# Patient Record
Sex: FEMALE | Race: WHITE | NOT HISPANIC OR LATINO | Employment: FULL TIME | URBAN - METROPOLITAN AREA
[De-identification: names, ages, dates, MRNs, and addresses within clinical notes are randomized per-mention and may not be internally consistent; named-entity substitution may affect disease eponyms.]

---

## 2021-04-12 ENCOUNTER — OFFICE VISIT (OUTPATIENT)
Dept: FAMILY MEDICINE | Facility: CLINIC | Age: 36
End: 2021-04-12
Payer: COMMERCIAL

## 2021-04-12 VITALS
OXYGEN SATURATION: 98 % | WEIGHT: 231.25 LBS | HEART RATE: 100 BPM | SYSTOLIC BLOOD PRESSURE: 120 MMHG | HEIGHT: 67 IN | BODY MASS INDEX: 36.29 KG/M2 | TEMPERATURE: 99 F | DIASTOLIC BLOOD PRESSURE: 80 MMHG

## 2021-04-12 DIAGNOSIS — R59.9 REACTIVE LYMPHADENOPATHY: ICD-10-CM

## 2021-04-12 DIAGNOSIS — E66.9 OBESITY (BMI 30-39.9): ICD-10-CM

## 2021-04-12 DIAGNOSIS — R21 RASH: ICD-10-CM

## 2021-04-12 DIAGNOSIS — Z00.00 PREVENTATIVE HEALTH CARE: Primary | ICD-10-CM

## 2021-04-12 PROCEDURE — 99999 PR PBB SHADOW E&M-NEW PATIENT-LVL III: CPT | Mod: PBBFAC,,, | Performed by: FAMILY MEDICINE

## 2021-04-12 PROCEDURE — 99385 PR PREVENTIVE VISIT,NEW,18-39: ICD-10-PCS | Mod: S$GLB,,, | Performed by: FAMILY MEDICINE

## 2021-04-12 PROCEDURE — 99999 PR PBB SHADOW E&M-NEW PATIENT-LVL III: ICD-10-PCS | Mod: PBBFAC,,, | Performed by: FAMILY MEDICINE

## 2021-04-12 PROCEDURE — 99385 PREV VISIT NEW AGE 18-39: CPT | Mod: S$GLB,,, | Performed by: FAMILY MEDICINE

## 2021-04-12 PROCEDURE — 3008F BODY MASS INDEX DOCD: CPT | Mod: CPTII,S$GLB,, | Performed by: FAMILY MEDICINE

## 2021-04-12 PROCEDURE — 3008F PR BODY MASS INDEX (BMI) DOCUMENTED: ICD-10-PCS | Mod: CPTII,S$GLB,, | Performed by: FAMILY MEDICINE

## 2021-04-12 RX ORDER — METHYLPREDNISOLONE 4 MG/1
TABLET ORAL
Qty: 1 PACKAGE | Refills: 0 | Status: SHIPPED | OUTPATIENT
Start: 2021-04-12 | End: 2021-11-10

## 2021-04-12 RX ORDER — DOXYCYCLINE 100 MG/1
100 CAPSULE ORAL 2 TIMES DAILY
Qty: 20 CAPSULE | Refills: 0 | Status: SHIPPED | OUTPATIENT
Start: 2021-04-12 | End: 2021-11-10

## 2021-04-13 ENCOUNTER — LAB VISIT (OUTPATIENT)
Dept: LAB | Facility: HOSPITAL | Age: 36
End: 2021-04-13
Attending: FAMILY MEDICINE
Payer: COMMERCIAL

## 2021-04-13 DIAGNOSIS — Z00.00 PREVENTATIVE HEALTH CARE: ICD-10-CM

## 2021-04-13 LAB
ALBUMIN SERPL BCP-MCNC: 3.5 G/DL (ref 3.5–5.2)
ALP SERPL-CCNC: 96 U/L (ref 55–135)
ALT SERPL W/O P-5'-P-CCNC: 49 U/L (ref 10–44)
ANION GAP SERPL CALC-SCNC: 8 MMOL/L (ref 8–16)
AST SERPL-CCNC: 27 U/L (ref 10–40)
BASOPHILS # BLD AUTO: 0.03 K/UL (ref 0–0.2)
BASOPHILS NFR BLD: 0.6 % (ref 0–1.9)
BILIRUB SERPL-MCNC: 0.4 MG/DL (ref 0.1–1)
BUN SERPL-MCNC: 13 MG/DL (ref 6–20)
CALCIUM SERPL-MCNC: 8.5 MG/DL (ref 8.7–10.5)
CHLORIDE SERPL-SCNC: 106 MMOL/L (ref 95–110)
CHOLEST SERPL-MCNC: 226 MG/DL (ref 120–199)
CHOLEST/HDLC SERPL: 4.9 {RATIO} (ref 2–5)
CO2 SERPL-SCNC: 24 MMOL/L (ref 23–29)
CREAT SERPL-MCNC: 0.9 MG/DL (ref 0.5–1.4)
DIFFERENTIAL METHOD: ABNORMAL
EOSINOPHIL # BLD AUTO: 0.1 K/UL (ref 0–0.5)
EOSINOPHIL NFR BLD: 2.1 % (ref 0–8)
ERYTHROCYTE [DISTWIDTH] IN BLOOD BY AUTOMATED COUNT: 14.1 % (ref 11.5–14.5)
EST. GFR  (AFRICAN AMERICAN): >60 ML/MIN/1.73 M^2
EST. GFR  (NON AFRICAN AMERICAN): >60 ML/MIN/1.73 M^2
GLUCOSE SERPL-MCNC: 92 MG/DL (ref 70–110)
HCT VFR BLD AUTO: 39.3 % (ref 37–48.5)
HDLC SERPL-MCNC: 46 MG/DL (ref 40–75)
HDLC SERPL: 20.4 % (ref 20–50)
HGB BLD-MCNC: 12.3 G/DL (ref 12–16)
IMM GRANULOCYTES # BLD AUTO: 0.02 K/UL (ref 0–0.04)
IMM GRANULOCYTES NFR BLD AUTO: 0.4 % (ref 0–0.5)
LDLC SERPL CALC-MCNC: 153.2 MG/DL (ref 63–159)
LYMPHOCYTES # BLD AUTO: 1.7 K/UL (ref 1–4.8)
LYMPHOCYTES NFR BLD: 34.2 % (ref 18–48)
MCH RBC QN AUTO: 27 PG (ref 27–31)
MCHC RBC AUTO-ENTMCNC: 31.3 G/DL (ref 32–36)
MCV RBC AUTO: 86 FL (ref 82–98)
MONOCYTES # BLD AUTO: 0.4 K/UL (ref 0.3–1)
MONOCYTES NFR BLD: 8.7 % (ref 4–15)
NEUTROPHILS # BLD AUTO: 2.6 K/UL (ref 1.8–7.7)
NEUTROPHILS NFR BLD: 54 % (ref 38–73)
NONHDLC SERPL-MCNC: 180 MG/DL
NRBC BLD-RTO: 0 /100 WBC
PLATELET # BLD AUTO: 285 K/UL (ref 150–450)
PMV BLD AUTO: 12.2 FL (ref 9.2–12.9)
POTASSIUM SERPL-SCNC: 4.1 MMOL/L (ref 3.5–5.1)
PROT SERPL-MCNC: 7.2 G/DL (ref 6–8.4)
RBC # BLD AUTO: 4.55 M/UL (ref 4–5.4)
SODIUM SERPL-SCNC: 138 MMOL/L (ref 136–145)
T4 FREE SERPL-MCNC: 0.92 NG/DL (ref 0.71–1.51)
TRIGL SERPL-MCNC: 134 MG/DL (ref 30–150)
TSH SERPL DL<=0.005 MIU/L-ACNC: 4.49 UIU/ML (ref 0.4–4)
WBC # BLD AUTO: 4.85 K/UL (ref 3.9–12.7)

## 2021-04-13 PROCEDURE — 36415 COLL VENOUS BLD VENIPUNCTURE: CPT | Mod: PO | Performed by: FAMILY MEDICINE

## 2021-04-13 PROCEDURE — 84439 ASSAY OF FREE THYROXINE: CPT | Performed by: FAMILY MEDICINE

## 2021-04-13 PROCEDURE — 85025 COMPLETE CBC W/AUTO DIFF WBC: CPT | Performed by: FAMILY MEDICINE

## 2021-04-13 PROCEDURE — 84443 ASSAY THYROID STIM HORMONE: CPT | Performed by: FAMILY MEDICINE

## 2021-04-13 PROCEDURE — 86803 HEPATITIS C AB TEST: CPT | Performed by: FAMILY MEDICINE

## 2021-04-13 PROCEDURE — 80061 LIPID PANEL: CPT | Performed by: FAMILY MEDICINE

## 2021-04-13 PROCEDURE — 80053 COMPREHEN METABOLIC PANEL: CPT | Performed by: FAMILY MEDICINE

## 2021-04-13 PROCEDURE — 86703 HIV-1/HIV-2 1 RESULT ANTBDY: CPT | Performed by: FAMILY MEDICINE

## 2021-04-14 DIAGNOSIS — R79.89 ELEVATED TSH: Primary | ICD-10-CM

## 2021-04-14 LAB — HCV AB SERPL QL IA: NEGATIVE

## 2021-04-15 LAB — HIV 1+2 AB+HIV1 P24 AG SERPL QL IA: NEGATIVE

## 2021-10-20 ENCOUNTER — OFFICE VISIT (OUTPATIENT)
Dept: DERMATOLOGY | Facility: CLINIC | Age: 36
End: 2021-10-20
Payer: COMMERCIAL

## 2021-10-20 DIAGNOSIS — D48.5 NEOPLASM OF UNCERTAIN BEHAVIOR OF SKIN: Primary | ICD-10-CM

## 2021-10-20 DIAGNOSIS — D18.01 CHERRY ANGIOMA: ICD-10-CM

## 2021-10-20 DIAGNOSIS — D22.9 MULTIPLE BENIGN NEVI: ICD-10-CM

## 2021-10-20 PROCEDURE — 99999 PR PBB SHADOW E&M-EST. PATIENT-LVL III: ICD-10-PCS | Mod: PBBFAC,,, | Performed by: STUDENT IN AN ORGANIZED HEALTH CARE EDUCATION/TRAINING PROGRAM

## 2021-10-20 PROCEDURE — 99999 PR PBB SHADOW E&M-EST. PATIENT-LVL III: CPT | Mod: PBBFAC,,, | Performed by: STUDENT IN AN ORGANIZED HEALTH CARE EDUCATION/TRAINING PROGRAM

## 2021-10-20 PROCEDURE — 11102 TANGNTL BX SKIN SINGLE LES: CPT | Mod: S$GLB,,, | Performed by: STUDENT IN AN ORGANIZED HEALTH CARE EDUCATION/TRAINING PROGRAM

## 2021-10-20 PROCEDURE — 88305 TISSUE EXAM BY PATHOLOGIST: CPT | Mod: 26,,, | Performed by: PATHOLOGY

## 2021-10-20 PROCEDURE — 88305 TISSUE EXAM BY PATHOLOGIST: ICD-10-PCS | Mod: 26,,, | Performed by: PATHOLOGY

## 2021-10-20 PROCEDURE — 11102 PR TANGENTIAL BIOPSY, SKIN, SINGLE LESION: ICD-10-PCS | Mod: S$GLB,,, | Performed by: STUDENT IN AN ORGANIZED HEALTH CARE EDUCATION/TRAINING PROGRAM

## 2021-10-20 PROCEDURE — 88304 TISSUE EXAM BY PATHOLOGIST: CPT | Performed by: PATHOLOGY

## 2021-10-20 PROCEDURE — 1160F PR REVIEW ALL MEDS BY PRESCRIBER/CLIN PHARMACIST DOCUMENTED: ICD-10-PCS | Mod: CPTII,S$GLB,, | Performed by: STUDENT IN AN ORGANIZED HEALTH CARE EDUCATION/TRAINING PROGRAM

## 2021-10-20 PROCEDURE — 1160F RVW MEDS BY RX/DR IN RCRD: CPT | Mod: CPTII,S$GLB,, | Performed by: STUDENT IN AN ORGANIZED HEALTH CARE EDUCATION/TRAINING PROGRAM

## 2021-10-20 PROCEDURE — 99203 OFFICE O/P NEW LOW 30 MIN: CPT | Mod: 25,S$GLB,, | Performed by: STUDENT IN AN ORGANIZED HEALTH CARE EDUCATION/TRAINING PROGRAM

## 2021-10-20 PROCEDURE — 1159F PR MEDICATION LIST DOCUMENTED IN MEDICAL RECORD: ICD-10-PCS | Mod: CPTII,S$GLB,, | Performed by: STUDENT IN AN ORGANIZED HEALTH CARE EDUCATION/TRAINING PROGRAM

## 2021-10-20 PROCEDURE — 1159F MED LIST DOCD IN RCRD: CPT | Mod: CPTII,S$GLB,, | Performed by: STUDENT IN AN ORGANIZED HEALTH CARE EDUCATION/TRAINING PROGRAM

## 2021-10-20 PROCEDURE — 99203 PR OFFICE/OUTPT VISIT, NEW, LEVL III, 30-44 MIN: ICD-10-PCS | Mod: 25,S$GLB,, | Performed by: STUDENT IN AN ORGANIZED HEALTH CARE EDUCATION/TRAINING PROGRAM

## 2021-10-25 LAB
FINAL PATHOLOGIC DIAGNOSIS: NORMAL
GROSS: NORMAL
Lab: NORMAL
MICROSCOPIC EXAM: NORMAL

## 2022-03-02 ENCOUNTER — OFFICE VISIT (OUTPATIENT)
Dept: FAMILY MEDICINE | Facility: CLINIC | Age: 37
End: 2022-03-02
Payer: COMMERCIAL

## 2022-03-02 VITALS
SYSTOLIC BLOOD PRESSURE: 126 MMHG | WEIGHT: 241.63 LBS | TEMPERATURE: 98 F | HEART RATE: 99 BPM | HEIGHT: 66 IN | DIASTOLIC BLOOD PRESSURE: 86 MMHG | BODY MASS INDEX: 38.83 KG/M2 | OXYGEN SATURATION: 99 %

## 2022-03-02 DIAGNOSIS — J01.00 ACUTE NON-RECURRENT MAXILLARY SINUSITIS: Primary | ICD-10-CM

## 2022-03-02 PROCEDURE — 3074F PR MOST RECENT SYSTOLIC BLOOD PRESSURE < 130 MM HG: ICD-10-PCS | Mod: CPTII,S$GLB,, | Performed by: INTERNAL MEDICINE

## 2022-03-02 PROCEDURE — 99999 PR PBB SHADOW E&M-EST. PATIENT-LVL III: ICD-10-PCS | Mod: PBBFAC,,, | Performed by: INTERNAL MEDICINE

## 2022-03-02 PROCEDURE — 3079F PR MOST RECENT DIASTOLIC BLOOD PRESSURE 80-89 MM HG: ICD-10-PCS | Mod: CPTII,S$GLB,, | Performed by: INTERNAL MEDICINE

## 2022-03-02 PROCEDURE — 3008F BODY MASS INDEX DOCD: CPT | Mod: CPTII,S$GLB,, | Performed by: INTERNAL MEDICINE

## 2022-03-02 PROCEDURE — 3079F DIAST BP 80-89 MM HG: CPT | Mod: CPTII,S$GLB,, | Performed by: INTERNAL MEDICINE

## 2022-03-02 PROCEDURE — 1159F PR MEDICATION LIST DOCUMENTED IN MEDICAL RECORD: ICD-10-PCS | Mod: CPTII,S$GLB,, | Performed by: INTERNAL MEDICINE

## 2022-03-02 PROCEDURE — 99213 PR OFFICE/OUTPT VISIT, EST, LEVL III, 20-29 MIN: ICD-10-PCS | Mod: S$GLB,,, | Performed by: INTERNAL MEDICINE

## 2022-03-02 PROCEDURE — 1159F MED LIST DOCD IN RCRD: CPT | Mod: CPTII,S$GLB,, | Performed by: INTERNAL MEDICINE

## 2022-03-02 PROCEDURE — 99213 OFFICE O/P EST LOW 20 MIN: CPT | Mod: S$GLB,,, | Performed by: INTERNAL MEDICINE

## 2022-03-02 PROCEDURE — 3008F PR BODY MASS INDEX (BMI) DOCUMENTED: ICD-10-PCS | Mod: CPTII,S$GLB,, | Performed by: INTERNAL MEDICINE

## 2022-03-02 PROCEDURE — 3074F SYST BP LT 130 MM HG: CPT | Mod: CPTII,S$GLB,, | Performed by: INTERNAL MEDICINE

## 2022-03-02 PROCEDURE — 99999 PR PBB SHADOW E&M-EST. PATIENT-LVL III: CPT | Mod: PBBFAC,,, | Performed by: INTERNAL MEDICINE

## 2022-03-02 RX ORDER — PREDNISONE 20 MG/1
TABLET ORAL
Qty: 6 TABLET | Refills: 0 | Status: SHIPPED | OUTPATIENT
Start: 2022-03-02

## 2022-03-02 RX ORDER — AZITHROMYCIN 500 MG/1
500 TABLET, FILM COATED ORAL DAILY
Qty: 5 TABLET | Refills: 0 | Status: SHIPPED | OUTPATIENT
Start: 2022-03-02 | End: 2022-03-07

## 2022-03-02 NOTE — PROGRESS NOTES
"Subjective:      Patient ID: Dipika Harris is a 36 y.o. female.    Chief Complaint: Cough      HPI  Pt of Dr. Russ, here for over 7 days head and chest congestion, not better with dayquil.  No f/c.  Coughing up thick mucus.  No n/v/d.      Review of Systems   Constitutional: Negative for chills, diaphoresis and fever.   Respiratory: Negative for cough and shortness of breath.    Cardiovascular: Negative for chest pain, palpitations and leg swelling.   Gastrointestinal: Negative for blood in stool, constipation, diarrhea, nausea and vomiting.   Genitourinary: Negative for dysuria, frequency and hematuria.   Psychiatric/Behavioral: The patient is not nervous/anxious.          Objective:   /86   Pulse 99   Temp 98 °F (36.7 °C) (Temporal)   Ht 5' 6" (1.676 m)   Wt 109.6 kg (241 lb 10 oz)   LMP 02/10/2022   SpO2 99%   BMI 39.00 kg/m²     Physical Exam  Constitutional:       Appearance: She is well-developed.   HENT:      Right Ear: External ear normal. Tympanic membrane is not injected.      Left Ear: External ear normal. Tympanic membrane is not injected.   Eyes:      Conjunctiva/sclera: Conjunctivae normal.   Neck:      Thyroid: No thyroid mass or thyromegaly.      Vascular: No carotid bruit.   Cardiovascular:      Rate and Rhythm: Normal rate and regular rhythm.      Heart sounds: No murmur heard.    No friction rub. No gallop.   Pulmonary:      Effort: Pulmonary effort is normal.      Breath sounds: Normal breath sounds. No wheezing or rales.   Abdominal:      General: Bowel sounds are normal.      Palpations: Abdomen is soft. There is no mass.      Tenderness: There is no abdominal tenderness.   Musculoskeletal:      Cervical back: Neck supple.   Lymphadenopathy:      Cervical: No cervical adenopathy.   Neurological:      Mental Status: She is alert and oriented to person, place, and time.             Assessment:       1. Acute non-recurrent maxillary sinusitis          Plan:     Acute " non-recurrent maxillary sinusitis  -     azithromycin (ZITHROMAX) 500 MG tablet; Take 1 tablet (500 mg total) by mouth once daily. for 5 days  Dispense: 5 tablet; Refill: 0  -     predniSONE (DELTASONE) 20 MG tablet; 1 po daily x 4d, then half daily x 4d.  Dispense: 6 tablet; Refill: 0    Call if not better.

## 2024-02-05 ENCOUNTER — PATIENT OUTREACH (OUTPATIENT)
Dept: ADMINISTRATIVE | Facility: HOSPITAL | Age: 39
End: 2024-02-05
Payer: COMMERCIAL

## 2024-02-05 NOTE — PROGRESS NOTES
Working not seen in 12 months.  Pt last seen April 2021.     LM offering to schedule annual exam.

## 2024-04-18 NOTE — PROGRESS NOTES
"SUBJECTIVE:     Dipika Harris  MRN:  [id number]  38 y.o. female    CHIEF COMPLAINT:     Sinus issues    HPI:    Dipika Harris reports sinus issues x 3 weeks.  Tx to date includes Dayquil, Flonase, Vicks, Mucinex --- temp mild relief.  Unclear if she has underlying allergy issues.  Cough worse when lying down due to PND.  Possible underlying allergy issues.      Review of Systems   Constitutional:  Positive for malaise/fatigue.   HENT:  Positive for congestion, ear pain (popping at times) and sinus pain. Negative for hearing loss, nosebleeds, sore throat and tinnitus.    Respiratory:  Positive for cough (thinks from PND) and sputum production. Negative for hemoptysis, shortness of breath and wheezing.    Cardiovascular: Negative.    Neurological:  Positive for headaches (mild). Negative for dizziness, tingling, tremors, seizures, loss of consciousness and weakness.       Review of patient's allergies indicates:   Allergen Reactions    Cefaclor Rash       Patient Active Problem List   Diagnosis    Obesity (BMI 30-39.9)       No current outpatient medications        Past medical, surgical, family and social histories have been reviewed today.      OBJECTIVE:     Vitals:    04/19/24 0805   BP: 122/62   Pulse: 96   Temp: 98 °F (36.7 °C)   TempSrc: Tympanic   SpO2: 98%   Weight: 106.6 kg (235 lb 0.2 oz)   Height: 5' 6" (1.676 m)   PainSc:   2   PainLoc: Nose/Sinus       Physical Exam  Vitals reviewed.   Constitutional:       General: She is not in acute distress.  HENT:      Head: Normocephalic and atraumatic.      Right Ear: Tympanic membrane normal.      Left Ear: Tenderness present. A middle ear effusion is present. Tympanic membrane is injected, erythematous and bulging. Tympanic membrane is not perforated or retracted.      Nose: Nasal tenderness, mucosal edema and congestion present. No rhinorrhea.      Right Sinus: No maxillary sinus tenderness or frontal sinus tenderness.      Left Sinus: No maxillary " sinus tenderness or frontal sinus tenderness.      Mouth/Throat:      Mouth: Mucous membranes are moist.      Pharynx: Oropharynx is clear. No pharyngeal swelling or posterior oropharyngeal erythema.   Eyes:      General:         Right eye: No discharge.         Left eye: No discharge.      Conjunctiva/sclera: Conjunctivae normal.      Pupils: Pupils are equal, round, and reactive to light.   Cardiovascular:      Rate and Rhythm: Normal rate and regular rhythm.   Pulmonary:      Effort: Pulmonary effort is normal.      Breath sounds: Normal breath sounds.   Lymphadenopathy:      Cervical: No cervical adenopathy.   Skin:     Capillary Refill: Capillary refill takes less than 2 seconds.   Neurological:      Mental Status: She is alert and oriented to person, place, and time.      Motor: No weakness.      Gait: Gait normal.   Psychiatric:         Mood and Affect: Mood normal.         Behavior: Behavior normal.         Thought Content: Thought content normal.         Judgment: Judgment normal.         ASSESSMENT:     1. Rhinosinusitis  -     mometasone (NASONEX) 50 mcg/actuation nasal spray; 2 sprays by Nasal route once daily.  Dispense: 17 g; Refill: 3  -     amoxicillin-clavulanate 875-125mg (AUGMENTIN) 875-125 mg per tablet; Take 1 tablet by mouth 2 (two) times daily. for 10 days  Dispense: 20 tablet; Refill: 0    2. ETD (Eustachian tube dysfunction), left  -     mometasone (NASONEX) 50 mcg/actuation nasal spray; 2 sprays by Nasal route once daily.  Dispense: 17 g; Refill: 3    3. Cough, unspecified type      PLAN:     ETD protocol discussed, information added to AVS.  Medication discussed, as directed.  Supportive care, rest, hydration.  Contact office back if s/s worsen or persist.  RTC as directed and/or prn.        AVELINO Soriano  [facility]       25 minutes of total time spent on the encounter, which includes face to face time and non-face to face time preparing to see the  patient.  This includes obtaining and/or reviewing separately obtained history, performing a medically appropriate examination and/or evaluation, and counseling and educating the patient/family/caregiver.  Includes documenting clinical information in the electronic or other health record, independently interpreting results (not separately reported) and communicating results to the patient/family/caregiver, with care coordination (not separately reported).  Medications, tests and/or procedures ordered as necessary along with referring and communicating with other health professionals (when not separately reported).

## 2024-04-19 ENCOUNTER — OFFICE VISIT (OUTPATIENT)
Dept: FAMILY MEDICINE | Facility: CLINIC | Age: 39
End: 2024-04-19
Payer: COMMERCIAL

## 2024-04-19 VITALS
HEIGHT: 66 IN | BODY MASS INDEX: 37.77 KG/M2 | WEIGHT: 235 LBS | DIASTOLIC BLOOD PRESSURE: 62 MMHG | HEART RATE: 96 BPM | SYSTOLIC BLOOD PRESSURE: 122 MMHG | OXYGEN SATURATION: 98 % | TEMPERATURE: 98 F

## 2024-04-19 DIAGNOSIS — H69.92 ETD (EUSTACHIAN TUBE DYSFUNCTION), LEFT: ICD-10-CM

## 2024-04-19 DIAGNOSIS — J32.9 RHINOSINUSITIS: Primary | ICD-10-CM

## 2024-04-19 DIAGNOSIS — R05.9 COUGH, UNSPECIFIED TYPE: ICD-10-CM

## 2024-04-19 PROCEDURE — 1159F MED LIST DOCD IN RCRD: CPT | Mod: CPTII,S$GLB,, | Performed by: REGISTERED NURSE

## 2024-04-19 PROCEDURE — 99213 OFFICE O/P EST LOW 20 MIN: CPT | Mod: S$GLB,,, | Performed by: REGISTERED NURSE

## 2024-04-19 PROCEDURE — 3074F SYST BP LT 130 MM HG: CPT | Mod: CPTII,S$GLB,, | Performed by: REGISTERED NURSE

## 2024-04-19 PROCEDURE — 3008F BODY MASS INDEX DOCD: CPT | Mod: CPTII,S$GLB,, | Performed by: REGISTERED NURSE

## 2024-04-19 PROCEDURE — 1160F RVW MEDS BY RX/DR IN RCRD: CPT | Mod: CPTII,S$GLB,, | Performed by: REGISTERED NURSE

## 2024-04-19 PROCEDURE — 3078F DIAST BP <80 MM HG: CPT | Mod: CPTII,S$GLB,, | Performed by: REGISTERED NURSE

## 2024-04-19 PROCEDURE — 99999 PR PBB SHADOW E&M-EST. PATIENT-LVL IV: CPT | Mod: PBBFAC,,, | Performed by: REGISTERED NURSE

## 2024-04-19 RX ORDER — AMOXICILLIN AND CLAVULANATE POTASSIUM 875; 125 MG/1; MG/1
1 TABLET, FILM COATED ORAL 2 TIMES DAILY
Qty: 20 TABLET | Refills: 0 | Status: SHIPPED | OUTPATIENT
Start: 2024-04-19 | End: 2024-04-29

## 2024-04-19 RX ORDER — MOMETASONE FUROATE 50 UG/1
2 SPRAY, METERED NASAL DAILY
Qty: 17 G | Refills: 3 | Status: SHIPPED | OUTPATIENT
Start: 2024-04-19

## 2024-04-19 NOTE — PATIENT INSTRUCTIONS
Eustachian Tube Dysfunction Protocol     Day 1-3:  Afrin (pump spray mist OTC) 2 sprays in each nostril twice a day  2.   Nasonex nasal spray 2 sprays in each nostril twice a day     Day 4 and on:  Nasonex nasal spray 2 sprays in each nostril once a day     Buy the OTC Afrin pump spray mist; should be in the allergy/cold/sinus section of the pharmacy  Autoinsufflate or pop your ears gently at least 4 times per day  Make sure you stop using Afrin AFTER THE 3RD DAY as there is a risk of becoming dependent on that medication

## 2024-07-01 ENCOUNTER — OFFICE VISIT (OUTPATIENT)
Dept: PODIATRY | Facility: CLINIC | Age: 39
End: 2024-07-01
Payer: COMMERCIAL

## 2024-07-01 VITALS — BODY MASS INDEX: 37.77 KG/M2 | WEIGHT: 235 LBS | HEIGHT: 66 IN

## 2024-07-01 DIAGNOSIS — M62.461 GASTROCNEMIUS EQUINUS OF RIGHT LOWER EXTREMITY: ICD-10-CM

## 2024-07-01 DIAGNOSIS — M72.2 PLANTAR FASCIITIS: Primary | ICD-10-CM

## 2024-07-01 PROCEDURE — 1159F MED LIST DOCD IN RCRD: CPT | Mod: CPTII,S$GLB,, | Performed by: PODIATRIST

## 2024-07-01 PROCEDURE — 99203 OFFICE O/P NEW LOW 30 MIN: CPT | Mod: S$GLB,,, | Performed by: PODIATRIST

## 2024-07-01 PROCEDURE — 99999 PR PBB SHADOW E&M-EST. PATIENT-LVL III: CPT | Mod: PBBFAC,,, | Performed by: PODIATRIST

## 2024-07-01 PROCEDURE — 3008F BODY MASS INDEX DOCD: CPT | Mod: CPTII,S$GLB,, | Performed by: PODIATRIST

## 2024-07-01 RX ORDER — DICLOFENAC SODIUM 75 MG/1
75 TABLET, DELAYED RELEASE ORAL 2 TIMES DAILY
Qty: 20 TABLET | Refills: 0 | Status: SHIPPED | OUTPATIENT
Start: 2024-07-01 | End: 2024-07-11

## 2024-07-01 NOTE — PATIENT INSTRUCTIONS
"Thank you for choosing [facility] and Dr. Daniela Lawler and her team to take care of you.      I have provided further information for you about your diagnoses and/or aftercare for treatment.     You may receive a survey asking you about your visit today. We hope that we have provided you with a 5-star experience! If you felt that you received exemplary care today, please consider leaving us this feedback on the survey that you will receive in the next few days.     The survey might arrive via email, Tribold messages, text messages, or paper mail.    We sincerely appreciate you!      Sincerely,  Dr. Daniela Lawler          I recommend searching for "Night Splint for Plantar Fasciitis" on www.amazon.com. You can read reviews to pick the best option, but the splint should look similar to a boot (as pictured below).  If you are able to wear the night splint for at least a few hours while sleeping, then this will help greatly with improving your pain especially in the morning.     A night splint is to be worn at night while you are sleeping.     A more flexible option, if you are not able to tolerate the boot-- STRASSBURG SOCK. You can search this online as well.         "

## 2024-07-01 NOTE — PROGRESS NOTES
Podiatry Department    Patient ID: Dipika Harris is a 39 y.o. female.    Chief Complaint: Heel Pain (C/o right heel pain, plantar aspect, x 6 months, rates pain 4/10, 0 injury, pt states that pain started after wearing a certain pair of shoes, non-diabetic, wears tennis and socks)    History of Present Illness    CHIEF COMPLAINT:  Patient presents today for heel pain.    HEEL PAIN:  She reports right heel pain for the past 6 months, located on the side of the heel. The pain is usually present every day and is worse when first stepping on the ground in the morning. Certain shoes, such as Barcenas, help alleviate the pain, while shoes that are flat or less cushiony exacerbate it. She denies pain in other areas of the foot or ankle.    ACHILLES TENDON TIGHTNESS:  She also reports tightness and a dull, achy pain in the right Achilles tendon area, specifically at the insertion point on the heel bone. This has been present for approximately 6 months and is exacerbated by wearing flat or less cushioned shoes, and alleviated with more supportive footwear like Barcenas tennis shoes.    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            Physical Exam    Skin: Normal.  Musculoskeletal: Pain upon palpation medial tubercle of calcaneus. Negative pain along the plantar medial band on right foot. Decreased ankle joint dorsiflexion with knee extended and flexed. Achilles is very tight.           Diagnoses:  1. Plantar fasciitis    2. Gastrocnemius equinus of right lower extremity    Other orders  -     diclofenac (VOLTAREN) 75 MG EC tablet; Take 1 tablet (75 mg total) by mouth 2 (two) times daily. for 10 days  Dispense: 20 tablet; Refill: 0        Assessment & Plan    - Diagnosed plantar fasciitis based on patient's history and exam findings  - Starting conservative treatment with anti-inflammatory medication, stretching, icing,  and rehab exercises  - If still symptomatic after 4-6 weeks of conservative treatment, will consider corticosteroid injection  PLANTAR FASCIITIS:  - Educated on plantar fasciitis, explaining the anatomy of the plantar fascia, common symptoms, and aggravating factors.  - Reassured that this is a very common condition.  - Started diclofenac 2 times daily for 10 days, sent to [facility].  - Follow up in 5 weeks.  - If still symptomatic at follow up, will consider corticosteroid injection.               Future Appointments   Date Time Provider Department Center   7/31/2024 11:30 AM Daniela Lawler DPM [facility]        This note was generated with the assistance of ambient listening technology. Verbal consent was obtained by the patient and accompanying visitor(s) for the recording of patient appointment to facilitate this note. I attest to having reviewed and edited the generated note for accuracy, though some syntax or spelling errors may persist. Please contact the author of this note for any clarification.      Report Electronically Signed By:     Daniela Lawler DPM   Podiatry  [facility]   7/1/2024

## 2024-08-19 ENCOUNTER — OFFICE VISIT (OUTPATIENT)
Dept: PODIATRY | Facility: CLINIC | Age: 39
End: 2024-08-19
Payer: COMMERCIAL

## 2024-08-19 VITALS — BODY MASS INDEX: 37.77 KG/M2 | WEIGHT: 235 LBS | HEIGHT: 66 IN

## 2024-08-19 DIAGNOSIS — M72.2 PLANTAR FASCIITIS: Primary | ICD-10-CM

## 2024-08-19 DIAGNOSIS — M62.461 GASTROCNEMIUS EQUINUS OF RIGHT LOWER EXTREMITY: ICD-10-CM

## 2024-08-19 PROCEDURE — 99214 OFFICE O/P EST MOD 30 MIN: CPT | Mod: 25,S$GLB,, | Performed by: PODIATRIST

## 2024-08-19 PROCEDURE — 99999 PR PBB SHADOW E&M-EST. PATIENT-LVL III: CPT | Mod: PBBFAC,,, | Performed by: PODIATRIST

## 2024-08-19 PROCEDURE — 1159F MED LIST DOCD IN RCRD: CPT | Mod: CPTII,S$GLB,, | Performed by: PODIATRIST

## 2024-08-19 PROCEDURE — 20550 NJX 1 TENDON SHEATH/LIGAMENT: CPT | Mod: RT,S$GLB,, | Performed by: PODIATRIST

## 2024-08-19 PROCEDURE — 3008F BODY MASS INDEX DOCD: CPT | Mod: CPTII,S$GLB,, | Performed by: PODIATRIST

## 2024-08-19 RX ADMIN — TRIAMCINOLONE ACETONIDE 40 MG: 40 INJECTION, SUSPENSION INTRA-ARTICULAR; INTRAMUSCULAR at 04:08

## 2024-08-22 RX ORDER — TRIAMCINOLONE ACETONIDE 40 MG/ML
40 INJECTION, SUSPENSION INTRA-ARTICULAR; INTRAMUSCULAR ONCE
Status: COMPLETED | OUTPATIENT
Start: 2024-08-22 | End: 2024-08-19

## 2024-08-22 NOTE — PROGRESS NOTES
Podiatry Department    Patient ID: Dipika Harris is a 39 y.o. female.    Chief Complaint: Follow-up (4 week f/u for PF, poss inj, rates pain 6/10, pt states it still bothers her, non-diabetic, wears..)    History of Present Illness    CHIEF COMPLAINT:  Patient presents today for follow-up of right heel pain.    RIGHT HEEL PAIN:  She experiences pain upon taking her first steps in the morning, rating it an average of 5 out of 10. For current management, she has been stretching, icing, and using new Barcenas shoes with inserts.    ALLERGIES:  She reports no drug allergies to corticosteroids.    ROS:  Musculoskeletal: +muscle pain, +joint pain            Physical Exam    Musculoskeletal: Tenderness to palpation, medial tubercle of right calcaneus. Limited dorsiflexion with knee extended and flexed.           Diagnoses:  1. Plantar fasciitis, Right Foot    2. Gastrocnemius equinus of right lower extremity    Other orders  -     triamcinolone acetonide injection 40 mg        Assessment & Plan    - Assessed patient's right heel pain, noting pain upon first morning steps and average pain level of 5/10  - Determined steroid injection appropriate for pain alleviation  - Considered more aggressive treatment if pain persists after injection  HEEL PAIN:  - Administered injection of 2 cc Kenalog 40 and 0.5% Marcaine plain to right heel.  - Steroid injection to right heel performed.  - Explained expected timeline of injection effects: numbness for a few hours, steroid efficacy in 3-5 days.  - Patient to ice the injection site immediately after procedure.  - Patient to continue stretching and icing as part of rehabilitation.  FOLLOW UP:  - Follow up in 1 month if pain persists.  - Contact the office to schedule appointment if still experiencing pain after 1 month.            No future appointments.     This note was generated with the assistance of ambient listening technology. Verbal consent was obtained by the patient and  accompanying visitor(s) for the recording of patient appointment to facilitate this note. I attest to having reviewed and edited the generated note for accuracy, though some syntax or spelling errors may persist. Please contact the author of this note for any clarification.      Report Electronically Signed By:     Daniela Lawler DPM   Podiatry  [facility]  8/22/2024

## 2024-12-12 ENCOUNTER — PATIENT MESSAGE (OUTPATIENT)
Dept: RESEARCH | Facility: HOSPITAL | Age: 39
End: 2024-12-12
Payer: COMMERCIAL

## 2025-04-25 NOTE — PROGRESS NOTES
Dipika Harris  MRN:  [id number]  39 y.o. female      Patient Care Team:  Jo Russ MD as PCP - General (Family Medicine)  Emeterio Reeder NP as Nurse Practitioner (Family Medicine)  Paris Grewal MD as Obstetrician (Obstetrics and Gynecology)      SUBJECTIVE:     CHIEF COMPLAINT:  - Left temporal pain x 3 weeks    HPI:  Ms. Harris reports localized pain in the temple area for approximately 3 weeks. The pain is severe when the area is palpated but does not bother her unless touched. The onset was sudden, first noticed at work upon touching the area. The pain is intermittent and does not affect her vision or cause dizziness or nausea. She has not taken any medication for the pain as it does not bother her enough when not touched. She wears contacts and glasses and had an eye exam 6-8 months ago. She reports occasionally hearing her ears pop when swallowing and feeling like she has fluid in her ears, which is a chronic issue. She reports using Flonase or Nasonex, and Afrin nasal sprays in the past for similar symptoms, with a previous episode occurring about a year ago.     Trig neuralgia:  Recurrent attacks of unilateral facial pain < 2 min ----- NO  Severe pain, sharb, stabbing or electric-like ---- NO  Pain localizes in trigeminal nerve distribution ---- NO  Pain triggered by talking, chewing, brushing teeth ---- NO      Review of Systems   Constitutional: Negative.    HENT:  Negative for congestion, ear pain, hearing loss, sinus pain and tinnitus.         + occ popping in ear, pressure   Eyes: Negative.    Respiratory: Negative.     Cardiovascular: Negative.    Gastrointestinal:  Negative for nausea and vomiting.   Skin: Negative.    Neurological:  Positive for headaches. Negative for dizziness, tingling, tremors, loss of consciousness and weakness.       Review of patient's allergies indicates:   Allergen Reactions    Cefaclor Rash         Problem List[1]      No current outpatient  "medications      Past medical, surgical, family and social histories have been reviewed today.      OBJECTIVE:     Vitals:    04/28/25 1000   BP: 112/72   Pulse: 71   Resp: 18   Temp: 97.2 °F (36.2 °C)   TempSrc: Tympanic   SpO2: 99%   Weight: 112.7 kg (248 lb 7.3 oz)   Height: 5' 6" (1.676 m)     Vitals:    04/28/25 1000   PainSc:   2   PainLoc: Head       Physical Exam  Vitals reviewed.   Constitutional:       General: She is not in acute distress.     Appearance: She is not ill-appearing or diaphoretic.   HENT:      Head: Normocephalic and atraumatic.      Right Ear: Tympanic membrane normal. No tenderness. No middle ear effusion. Tympanic membrane is not injected, scarred, perforated, erythematous or retracted.      Left Ear: No tenderness.  No middle ear effusion. Tympanic membrane is retracted (with dull TM, absent LR). Tympanic membrane is not injected, scarred, perforated, erythematous or bulging.      Nose: Nose normal.      Right Sinus: No maxillary sinus tenderness or frontal sinus tenderness.      Left Sinus: No maxillary sinus tenderness or frontal sinus tenderness.      Mouth/Throat:      Mouth: Mucous membranes are moist.      Pharynx: Oropharynx is clear.   Eyes:      Conjunctiva/sclera: Conjunctivae normal.      Pupils: Pupils are equal, round, and reactive to light.   Cardiovascular:      Rate and Rhythm: Normal rate and regular rhythm.   Pulmonary:      Effort: Pulmonary effort is normal.      Breath sounds: Normal breath sounds.   Lymphadenopathy:      Cervical: No cervical adenopathy.   Skin:     Capillary Refill: Capillary refill takes less than 2 seconds.   Neurological:      Mental Status: She is alert and oriented to person, place, and time.      Sensory: No sensory deficit.      Motor: No weakness.      Coordination: Coordination normal.      Gait: Gait normal.   Psychiatric:         Mood and Affect: Mood normal.         Behavior: Behavior normal.         Thought Content: Thought content " normal.         Judgment: Judgment normal.         ASSESSMENT:     1. Dysfunction of left eustachian tube ---- recurrent issue, likely causing referred pain to left temple   Day 1 to 3 --- Afrin and Flonase/Nasonex bid   Day 4 ---- stop Afrin, continue steroid spray    2. Temporal pain ---- see # 1      PLAN:     Doubt issue r/t trigeminal neuralgia.  May consider short course of oral steroid if above not effective.  To ENT if needed.  RTC as directed and/or prn.        AVELINO Soriano  [facility]       Future Appointments   Date Time Provider Department Center   10/13/2025 10:20 AM Paris Grewal MD [facility] [place] [facility]         30 minutes of total time spent on the encounter, which includes face to face time and non-face to face time preparing to see the patient.  This includes obtaining and/or reviewing separately obtained history, performing a medically appropriate examination and/or evaluation, and counseling and educating the patient/family/caregiver.  Includes documenting clinical information in the electronic or other health record, independently interpreting results (not separately reported) and communicating results to the patient/family/caregiver, with care coordination (not separately reported).  Medications, tests and/or procedures ordered as necessary along with referring and communicating with other health professionals (when not separately reported).           [1]   Patient Active Problem List  Diagnosis    Morbid obesity with BMI of 40.0-44.9, adult

## 2025-04-28 ENCOUNTER — OFFICE VISIT (OUTPATIENT)
Dept: FAMILY MEDICINE | Facility: CLINIC | Age: 40
End: 2025-04-28
Payer: COMMERCIAL

## 2025-04-28 VITALS
WEIGHT: 248.44 LBS | HEIGHT: 66 IN | OXYGEN SATURATION: 99 % | BODY MASS INDEX: 39.93 KG/M2 | RESPIRATION RATE: 18 BRPM | TEMPERATURE: 97 F | HEART RATE: 71 BPM | SYSTOLIC BLOOD PRESSURE: 112 MMHG | DIASTOLIC BLOOD PRESSURE: 72 MMHG

## 2025-04-28 DIAGNOSIS — H69.92 DYSFUNCTION OF LEFT EUSTACHIAN TUBE: Primary | ICD-10-CM

## 2025-04-28 DIAGNOSIS — R51.9 TEMPORAL PAIN: ICD-10-CM

## 2025-04-28 PROBLEM — E66.01 MORBID OBESITY WITH BMI OF 40.0-44.9, ADULT: Status: ACTIVE | Noted: 2025-04-28

## 2025-04-28 PROCEDURE — 3008F BODY MASS INDEX DOCD: CPT | Mod: CPTII,S$GLB,, | Performed by: REGISTERED NURSE

## 2025-04-28 PROCEDURE — 99214 OFFICE O/P EST MOD 30 MIN: CPT | Mod: S$GLB,,, | Performed by: REGISTERED NURSE

## 2025-04-28 PROCEDURE — 1159F MED LIST DOCD IN RCRD: CPT | Mod: CPTII,S$GLB,, | Performed by: REGISTERED NURSE

## 2025-04-28 PROCEDURE — 3074F SYST BP LT 130 MM HG: CPT | Mod: CPTII,S$GLB,, | Performed by: REGISTERED NURSE

## 2025-04-28 PROCEDURE — 3078F DIAST BP <80 MM HG: CPT | Mod: CPTII,S$GLB,, | Performed by: REGISTERED NURSE

## 2025-04-28 PROCEDURE — G2211 COMPLEX E/M VISIT ADD ON: HCPCS | Mod: S$GLB,,, | Performed by: REGISTERED NURSE

## 2025-04-28 PROCEDURE — 99999 PR PBB SHADOW E&M-EST. PATIENT-LVL IV: CPT | Mod: PBBFAC,,, | Performed by: REGISTERED NURSE

## 2025-04-28 NOTE — PATIENT INSTRUCTIONS
Eustachian Tube Dysfunction Protocol     Day 1-3:  Afrin (pump spray mist OTC) 2 sprays in each nostril twice a day  Nasonex nasal spray 2 sprays in each nostril twice a day     Day 4 and on:  Nasonex nasal spray 2 sprays in each nostril     Buy the OTC Afrin pump spray mist; should be in the allergy/cold/sinus section of the pharmacy  Autoinsufflate or pop your ears gently at least 4 times per day  Make sure you stop using Afrin AFTER THE 3RD DAY as there is a risk of becoming dependent on that medication

## 2025-06-12 DIAGNOSIS — M25.471 RIGHT ANKLE SWELLING: Primary | ICD-10-CM

## 2025-06-12 NOTE — PROGRESS NOTES
"            [address] [facility] [place] [address]   Phone [phone]  Fax [phone]           CHIEF COMPLAINT: Pain of the Right Ankle       HISTORY OF PRESENT ILLNESS (JN) (06/13/2025):    History of Present Illness    HPI:  Dipika presents with right ankle pain and swelling that began suddenly on Tuesday morning 06/03/2025.  She denies any specific trauma.  Her pain is 2/10 today.. Pain was initially located in the anterior aspect of the ankle but has since improved. Swelling persists and increases by the end of the day, extending proximally. She reports difficulty flexing her ankle fully when walking and occasional tightness in her calf. Pain is exacerbated with flexion.    She denies any specific injury or change in activity prior to symptom onset, stating she only performed routine activities such as being around the house and grocery shopping on Monday. She denies any new workout routine.    She has attempted to manage symptoms with KT tape application. She reports favoring her right leg due to previous knee surgery on her left leg. She denies any previous ankle issues, though she recalls spraining her left ankle once in the past.    She works in a sedentary job, primarily sitting throughout the day.          Disclaimer: The history above was obtained through ambient listening technology thus may contain errors.     PAST MEDICAL HISTORY:    Past Medical History:   Diagnosis Date    Abnormal Pap smear of cervix 09/2020    ASCUS HPV positive    Chlamydia 10/2020    Hyperlipidemia     Obesity (BMI 30-39.9)        Patient's Latest A1C:No results found for: "LABA1C", "HGBA1C"       PAST SURGICAL HISTORY:    Past Surgical History:   Procedure Laterality Date    COLPOSCOPY  2020    KNEE ARTHROSCOPY W/ ACL RECONSTRUCTION Left 11/2015    With medial meniscal repair     TYMPANOSTOMY TUBE PLACEMENT      WISDOM TOOTH EXTRACTION  2003        MEDICATIONS:  No current outpatient medications on file.   "   ALLERGIES:     Review of patient's allergies indicates:   Allergen Reactions    Cefaclor Rash            FAMILY HISTORY:   Family History   Problem Relation Name Age of Onset    Breast cancer Mother Evelyne 70        Negative genetic testing    Hypertension Mother Evelyne     Prostate cancer Father Pablo     Memory loss Father Pablo     Hypertension Father Pablo     Cancer Father Pablo         Prostate cancer, age 66. Radiation, never returned    Stroke Maternal Grandmother Diana     Breast cancer Paternal Grandmother Heather 70    Stroke Maternal Aunt      Breast cancer Maternal Aunt Nathalie Lloyd. Diagnosed in 20s, spread all over,  late 30s 1980s)    Stroke Maternal Aunt Ebony     Cancer Maternal Uncle Chidi         Pancreatic cancer, late 60s.  at 69    Breast cancer Paternal Aunt Andressa 62        Negative genetic testing           SOCIAL HISTORY:    Social History     Socioeconomic History    Marital status: Single   Tobacco Use    Smoking status: Never    Smokeless tobacco: Never   Substance and Sexual Activity    Alcohol use: Yes     Alcohol/week: 1.0 standard drink of alcohol     Types: 1 Glasses of wine per week     Comment: Thats more like in a 2-3 week time    Drug use: Never    Sexual activity: Yes     Partners: Male     Birth control/protection: Abstinence, Coitus interruptus, Condom   Social History Narrative    The patient is single and lives alone.  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     Social Drivers of Health     Financial Resource Strain: Low Risk  (2025)    Overall Financial Resource Strain (CARDIA)     Difficulty of Paying Living Expenses: Not very hard   Food Insecurity: No Food Insecurity (2025)    Hunger Vital Sign     Worried About Running Out of Food in the Last Year: Never true     Ran Out of Food in the Last Year: Never true   Transportation Needs: No Transportation Needs (2025)    PRAPARE - Transportation     Lack of Transportation  "(Medical): No     Lack of Transportation (Non-Medical): No   Physical Activity: Insufficiently Active (4/27/2025)    Exercise Vital Sign     Days of Exercise per Week: 2 days     Minutes of Exercise per Session: 20 min   Stress: Stress Concern Present (4/27/2025)    [facility] [employer] - Occupational Stress Questionnaire     Feeling of Stress : To some extent   Housing Stability: Low Risk  (4/27/2025)    Housing Stability Vital Sign     Unable to Pay for Housing in the Last Year: No     Number of Times Moved in the Last Year: 0     Homeless in the Last Year: No       PHYSICAL EXAMINATION:     Estimated body mass index is 40.1 kg/m² as calculated from the following:    Height as of this encounter: 5' 6" (1.676 m).    Weight as of this encounter: 112.7 kg (248 lb 7.3 oz).   ASSISTIVE DEVICE:  None    MUSCULOSKELETAL:    Ankle Exam (affected extremity):   Inspection:         Gross deformity   (-)         Erythema   (-)        Ecchymosis   (-)          Swelling   (+) lateral hindfoot           Open wounds   (-)         Surgical scars   (-)                    Standing alignment:  Slight cavus  Palpation tenderness:  Bony:  Hindfoot:  Proximal fibula  (-)  Calcaneus  (-)   Distal fibula  (-)  Talus   (-)   Medial malleolus  (-)  CC joint/cuboid  (-)   Tibiotalar joint  (-)  Lateral gutter  (-)  Plantar fascia  (-)  Medial gutter  (-)   Midfoot:   TN joint   (-)   NC joints   (-)   TMT joints   (-)   Forefoot:   (-)      Soft tissue:     Tendons:    Achilles midsubstance (-)  Peroneal tendons  (+)   Achilles insertion  (-)  Posterior tibial tendon (-)   Retrocalcaneal bursa (-)  Anterior tibial tendon  (-)   Ligaments:   ATFL   (-)  Deltoid   (-)   CFL   (-)  Syndesmosis  (-)  ROM:  Affected Ankle:         DF:    10°        PF:    40°                 Pain    (+) with resisted eversion       Crepitance   (-)       Sensory:  Normal sensation to light touch in Sa/Jha/DP/SP/T nerve distributions      Motor: "               Fires EHL/FHL/Tibialis anterior/Gastrocsoleus   Vascular:  Foot WWP with brisk capillary refill    DP/PT pulses palpable  Special Tests:  Krishna   (-)  Anterior drawer   (-)  Calcaneal squeeze  (-)  Syndesmotic squeeze  (-)  No calf pain        IMAGING:      No image results found.         ASSESSMENT: 40 y.o. female  with:   Right peroneal tendon inflammation/tearing  Cavovarus foot    PLAN:  Data:  I independently visualized images including but not limited to xrays, MRI, or CT scan today  I reviewed available old/outside records  PT/OT:  Schedule: 1-2 times a week for 6 weeks. Ordered with focus on reducing pain/inflammation and improving strength, ROM, and function.  Home exercise program will also be implemented to maintain and improve upon the progress made during therapies.  Medications:  Meloxicam was prescribed today. NSAIDs: Risks and benefits of NSAIDS discussed. NSAIDS should be used sparingly and for brief courses if possible. If patient develops any gastrointestinal, cardiovascular, or any other unwanted side effects of the medication, the NSAID should be stopped.  DME and weightbearing status:  Lace-up ankle brace prescribed today. 15 minutes were spent sizing, fitting, and educating for durable medical equipment application today.  [id number].  Injections/Procedures/Surgey:  TBD  Advanced imaging:  MRI of ankle if no better  Work/school/disability note/status:  She is an  and has a sedentary job  Education:  I had a long discussion with the patient about the causes, treatments, and prognosis/natural history for this condition.  We discussed effective ways to improve symptoms as well as what types of activities may make the symptoms/prognosis worse. We discussed signs and symptoms and other reasons to return to clinic sooner.  Return to clinic:  In 6 weeks  Images needed at next visit:  None    This note was generated with the assistance of ambient listening technology  thus some errors may exist.  Please contact the author of this note for any clarification.           Physician Signature: An Page M.D.       Official Website  Schedule An Appointment

## 2025-06-13 ENCOUNTER — OFFICE VISIT (OUTPATIENT)
Dept: ORTHOPEDICS | Facility: CLINIC | Age: 40
End: 2025-06-13
Payer: COMMERCIAL

## 2025-06-13 ENCOUNTER — HOSPITAL ENCOUNTER (OUTPATIENT)
Dept: RADIOLOGY | Facility: HOSPITAL | Age: 40
Discharge: HOME | End: 2025-06-13
Attending: ORTHOPAEDIC SURGERY
Payer: COMMERCIAL

## 2025-06-13 VITALS — WEIGHT: 248.44 LBS | HEIGHT: 66 IN | BODY MASS INDEX: 39.93 KG/M2

## 2025-06-13 DIAGNOSIS — M25.571 RIGHT ANKLE PAIN: ICD-10-CM

## 2025-06-13 DIAGNOSIS — M25.471 RIGHT ANKLE SWELLING: ICD-10-CM

## 2025-06-13 DIAGNOSIS — M76.71 PERONEAL TENDINITIS OF RIGHT LOWER EXTREMITY: ICD-10-CM

## 2025-06-13 DIAGNOSIS — M25.471 RIGHT ANKLE SWELLING: Primary | ICD-10-CM

## 2025-06-13 PROCEDURE — 73630 X-RAY EXAM OF FOOT: CPT | Mod: 26,RT,, | Performed by: RADIOLOGY

## 2025-06-13 PROCEDURE — 73610 X-RAY EXAM OF ANKLE: CPT | Mod: 26,RT,, | Performed by: RADIOLOGY

## 2025-06-13 PROCEDURE — 73610 X-RAY EXAM OF ANKLE: CPT | Mod: TC,RT

## 2025-06-13 PROCEDURE — 73630 X-RAY EXAM OF FOOT: CPT | Mod: TC,RT

## 2025-06-13 PROCEDURE — 99999 PR PBB SHADOW E&M-EST. PATIENT-LVL III: CPT | Mod: PBBFAC,,, | Performed by: ORTHOPAEDIC SURGERY

## 2025-06-13 RX ORDER — MELOXICAM 15 MG/1
15 TABLET ORAL DAILY
Qty: 14 TABLET | Refills: 1 | Status: SHIPPED | OUTPATIENT
Start: 2025-06-13

## 2025-06-13 NOTE — PATIENT INSTRUCTIONS
Your first appointment for evaluation with physical therapy was scheduled today. If you may need to contact physical therapy, you may reach them at [phone].     Your feedback means a lot to Dr. Page and her office staff! If you have a moment, please feel free to leave a review specifically about about your experience with Dr. Page and her office staff!

## 2025-06-17 ENCOUNTER — CLINICAL SUPPORT (OUTPATIENT)
Dept: REHABILITATION | Facility: HOSPITAL | Age: 40
End: 2025-06-17
Attending: ORTHOPAEDIC SURGERY
Payer: COMMERCIAL

## 2025-06-17 DIAGNOSIS — M76.71 PERONEAL TENDINITIS OF RIGHT LOWER EXTREMITY: Primary | ICD-10-CM

## 2025-06-17 DIAGNOSIS — M25.571 ACUTE RIGHT ANKLE PAIN: ICD-10-CM

## 2025-06-17 DIAGNOSIS — R29.898 WEAKNESS OF RIGHT LOWER EXTREMITY: ICD-10-CM

## 2025-06-17 PROCEDURE — 97140 MANUAL THERAPY 1/> REGIONS: CPT

## 2025-06-17 PROCEDURE — 97112 NEUROMUSCULAR REEDUCATION: CPT

## 2025-06-17 PROCEDURE — 97161 PT EVAL LOW COMPLEX 20 MIN: CPT

## 2025-06-17 NOTE — PROGRESS NOTES
Outpatient Rehab    Physical Therapy Evaluation    Patient Name: Francoise Harris  MRN: [id number]  YOB: 1985  Encounter Date: 6/17/2025    Therapy Diagnosis:   Encounter Diagnoses   Name Primary?    Peroneal tendinitis of right lower extremity Yes    Acute right ankle pain     Weakness of right lower extremity      Physician: An Page MD    Physician Orders: Eval and Treat  Medical Diagnosis: Peroneal tendinitis of right lower extremity  Surgical Diagnosis: Not applicable for this Episode   Surgical Date: Not applicable for this Episode  Days Since Last Surgery: Not applicable for this Episode  Visit # / Visits Authorized:  1 / 1  Insurance Authorization Period: 6/13/2025 to 6/13/2026  Date of Evaluation: 6/17/2025  Plan of Care Certification: 6/17/2025 to 8/12/2025     Time In: 1537   Time Out: 1635  Total Time (in minutes): 58   Total Billable Time (in minutes):  56    Intake Outcome Measure for FOTO Survey    Therapist reviewed FOTO scores for Francoise Harris on 6/17/2025.   FOTO report - see Media section or FOTO account episode details.     Intake Score: 69%    Precautions:     Subjective   History of Present Illness  Francoise is a 40 y.o. female                  History of Present Condition/Illness: Patient reports she began having pain in right ankle 2 weeks ago with no clear VINNY. Patient notes she woke up with pain in posteriolateral right ankle one morning and it has not gotten better as she expected. Notes no correlating factors with start of symptoms. Plays softball regularly but out of season currently. Goes for walks or rides bike for exercise. States the medication and brace Dr. Page gave her have helped 95% but symptoms of pain and swelling continue at low level. Patient notes pain is mainly in the mornings or when walking. Swelling occurs at the end of the day that she manages with ice and elevation. Denies numbness/tingling. Reports history of plantar fascitis but states these  symptoms are much different and in different area. History of left ACL repair and meniscectomy 9 years ago with continued pain and weakness in left knee. 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 No symptoms on left foot/ankle.    Pain     Patient reports a current pain level of 0/10. Pain at best is reported as 0/10. Pain at worst is reported as 4/10.   Location: right posterior/lateral ankle  Clinical Progression (since onset): Improved  Pain Qualities: Sharp, Aching  Pain-Relieving Factors: Ice, Elevation, Medications - prescription  Pain-Aggravating Factors: Walking       Past Medical History/Physical Systems Review:   Dipika Harris  has a past medical history of Abnormal Pap smear of cervix, Chlamydia, Hyperlipidemia, and Obesity (BMI 30-39.9).    Dipika Harris  has a past surgical history that includes Knee arthroscopy w/ ACL reconstruction (Left, 11/2015); Tympanostomy tube placement; [place] tooth extraction (2003); and Colposcopy (2020).    Dipika has a current medication list which includes the following prescription(s): meloxicam.    Review of patient's allergies indicates:   Allergen Reactions    Cefaclor Rash      Objective          RANGE OF MOTION:   Ankle/Foot AROM/PROM Right Left Pain/Dysfunction with Movement Goal   Dorsiflexion (20º) 10 20  20   Plantarflexion (50º) 40 50  50   Inversion (35º) 30 35  35   Eversion (15º) 5 10  10     STRENGTH:   L/E MMT Right  (spine) Left Pain/Dysfunction with Movement Goal   Knee Extension 5/5 4+/5  5/5 B   Knee Flexion 5/5 4+/5  5/5 B   Ankle DF 4+/5 5/5  5/5 B   Ankle PF 4/5 5/5  5/5 B   Ankle Inversion 4/5 5/5  5/5 B   Ankle Eversion 4/5 5/5 pain 5/5 B     MUSCLE LENGTH:   Muscle Tested  Right Left  Limitation Goal   Gastrocnemius  [] Normal  [x] Limited [] Normal  [] Limited  Normal B   Soleus  [] Normal  [x] Limited [] Normal  [] Limited  Normal B     SPECIAL TESTS:    Right  (spine) Left  Goal   Anterior Drawer [] Positive     " [x] Negative [] Positive     [] Negative Negative B    Tarsal Tunnel Test  [] Positive     [x] Negative [] Positive     [] Negative Negative B    Krishna Test (Calf Squeeze) [] Positive     [x] Negative [] Positive     [] Negative Negative B      SENSATION  [x] Intact to Light Touch   [] Impaired:    PALPATION: Muscles: Increased tone and tenderness to palpation of: right peroneals. Structures: Increased tenderness to palpation of: right LOWER STRUCTURES : lateral malleolus.    POSTURE:  Pt presents with postural abnormalities which include:     [x] Forward Head                               [] Increased Lumbar Lordosis              [x] Rounded Shoulder                        [] Genu Recurvatum              [] Increased Thoracic Kyphosis        [] Genu Valgus              [] Trunk Deviated                              [] Pes Planus              [] Scapular Winging                          [] Other:     GAIT ANALYSIS The patient ambulated with the following assistive device: none; the pt presents with the following gait abnormalities: decreased push off on right and ankle/foot pronation on right    Function:     Intake Outcome Measure for FOTO Ankle Survey    Therapist reviewed FOTO scores for Francoise on 6/17/2025.   FOTO report - see Media section or FOTO account for episode details    Intake Score: 69%       Treatment     CPT Intervention Performed   Today Duration / Intensity   MT Soft tissue massage x Right peroneals    Joint Mobilizations x Dorsal glides of cuboid         TE                  NMR Home exercise plan  x Demonstrations, education, performance    Ankle Eversion x X30 red band    Seated Heel Raises x X30 with 30# KB    Single Leg Stance  5 x 30"               TA                        PLAN Bike, 4 way ankle, heel raises, leg press, squats, side stepping with band around foot, consider dry needling, gastroc stretch, soleus stretch       CPT Codes available for Billing:   (10) minutes of Manual therapy " (MT) to improve pain and ROM.  (00) minutes of Therapeutic Exercise (TE) to develop strength, endurance, range of motion, and flexibility.  (15) minutes of Neuromuscular Re-Education (NMR)  to improve: Balance, Coordination, Kinesthetic, Sense, Proprioception, and Posture.  (00) minutes of Therapeutic Activities (TA) to improve functional performance.  Vasopneumatic Device Therapy () for management of swelling/edema. (52996)  Unattended Electrical Stimulation (ES) for muscle performance or pain modulation.  BFR: Blood flow restriction applied during exercise    Assessment & Plan   Assessment  Francoise presents with a condition of Moderate complexity.   Presentation of Symptoms: Evolving  Will Comorbidities Impact Care: Yes  Past Medical History: 09/2020: Abnormal Pap smear of cervix     Comment:  ASCUS HPV positive 10/2020: Chlamydia No date: Hyperlipidemia No date: Obesity (BMI 30-39.9)     Functional Limitations: Activity tolerance, Ambulating on uneven surfaces, Completing work/school activities, Decreased ambulation distance/endurance, Maintaining balance, Gait limitations, Functional mobility, Painful locomotion/ambulation, Participating in leisure activities, Participating in sports, Performing household chores, Range of motion, Standing tolerance, Squatting  Impairments: Pain with functional activity, Impaired physical strength, Lack of appropriate home exercise program, Impaired balance, Activity intolerance, Abnormal or restricted range of motion, Abnormal gait  Personal Factors Affecting Prognosis: Pain    Patient Goal for Therapy (PT): Pt reported goals are to decrease overall pain levels in order to return to prior functional level.  Prognosis: Good  Prognosis Details: Anticipated Barriers for therapy: co-morbidities and sedentary lifestyle  Assessment Details: Pt presents with impairments in the following categories: IMPAIRMENTS: ROM, strength, muscle length, balance, posture, gait mechanics, functional  movement patterns, and edema.  Pt will benefit from skilled outpatient Physical Therapy to address the deficits stated above, provide pt/family education, and to maximize pt's level of independence.    Plan  From a physical therapy perspective, the patient would benefit from: Skilled Rehab Services    Planned therapy interventions include: Therapeutic exercise, Therapeutic activities, Neuromuscular re-education, Manual therapy, Aquatic therapy, and Gait training.    Planned modalities to include: Cryotherapy (cold pack), Electrical stimulation - attended, Electrical stimulation - passive/unattended, Mechanical traction, Thermotherapy (hot pack), Ultrasound, and Vasopneumatic pump.        Visit Frequency: 2 times Per Week for 8 Weeks.       This plan was discussed with Patient.   Discussion participants: Agreed Upon Plan of Care  Plan details: Outpatient Physical Therapy to include any combination of the following interventions: virtual visits, dry needling, modalities, electrical stimulation (IFC, Pre-Mod, Attended with Functional Dry Needling), Cervical/Lumbar Traction, Electrical Stimulation, Gait Training, Manual Therapy, Moist Heat/ Ice, Neuromuscular Re-ed, Patient Education, Self Care, Therapeutic Exercise, and Therapeutic Activites          The patient's spiritual, cultural, and educational needs were considered, and the patient is agreeable to the plan of care and goals.     Education  Education was done with Patient. The patient's learning style includes Demonstration and Listening. The patient Demonstrates understanding and Verbalizes understanding.         PURPOSE: Patient educated on the impairments noted above and the effects of physical therapy intervention to improve overall condition and QOL.  EXERCISE: Patient was educated on all the above exercise prior/during/after for proper posture, positioning, and execution for safe performance with home exercise program.  STRENGTH: Patient educated on the  importance of improved core and extremity strength in order to improve alignment of the spine and extremities with static positions and dynamic movement.  GAIT & BALANCE: Patient educated on the importance of strong core and lower extremity musculature in order to improve both static and dynamic balance, improve gait mechanics, reduce fall risk and improve household and community mobility.  ASSISTIVE DEVICE: Patient educated on proper utilization of assistive device for safe and efficient ambulation and to reduce risk of falls POSTURE: Patient educated on postural awareness to reduce stress and maintain optimal alignment of the spine with static positions and dynamic movement  TRANSFERS & TRANSITIONS: Patient educated on proper technique for bed mobility, transitions and transfers to improve body mechanics and decrease risk of injury.   POLICIES: Educated patient on scheduling, cancelation and no-show policy.       Goals:   Active       Functional outcome       Patient will show a significant change in FOTO patient-reported outcome tool to goal score to demonstrate subjective improvement       Start:  06/17/25    Expected End:  08/12/25            Patient will demonstrate independence in home program for support of progression       Start:  06/17/25    Expected End:  07/15/25               Pain       Patient will report pain of 0-1/10 demonstrating a reduction of overall pain       Start:  06/17/25    Expected End:  08/12/25            Patient will report a 2 point reduction in pain.       Start:  06/17/25    Expected End:  07/15/25               Range of Motion       Patient will achieve bilateral ankle eversion ROM 10 degrees       Start:  06/17/25    Expected End:  08/12/25               Strength       Patient will achieve bilateral ankle eversion strength of 4+/5       Start:  06/17/25    Expected End:  08/12/25                Ed Steele, PT

## 2025-06-19 ENCOUNTER — CLINICAL SUPPORT (OUTPATIENT)
Dept: REHABILITATION | Facility: HOSPITAL | Age: 40
End: 2025-06-19
Payer: COMMERCIAL

## 2025-06-19 DIAGNOSIS — M25.571 ACUTE RIGHT ANKLE PAIN: ICD-10-CM

## 2025-06-19 DIAGNOSIS — R29.898 WEAKNESS OF RIGHT LOWER EXTREMITY: ICD-10-CM

## 2025-06-19 DIAGNOSIS — M76.71 PERONEAL TENDINITIS OF RIGHT LOWER EXTREMITY: Primary | ICD-10-CM

## 2025-06-19 PROCEDURE — 97110 THERAPEUTIC EXERCISES: CPT

## 2025-06-19 PROCEDURE — 97530 THERAPEUTIC ACTIVITIES: CPT

## 2025-06-19 PROCEDURE — 97112 NEUROMUSCULAR REEDUCATION: CPT

## 2025-06-19 PROCEDURE — 97140 MANUAL THERAPY 1/> REGIONS: CPT

## 2025-06-19 NOTE — PROGRESS NOTES
"  Outpatient Rehab    Physical Therapy Visit    Patient Name: Francoise Harris  MRN: [id number]  YOB: 1985  Encounter Date: 6/19/2025    Therapy Diagnosis:   Encounter Diagnoses   Name Primary?    Peroneal tendinitis of right lower extremity Yes    Weakness of right lower extremity     Acute right ankle pain      Physician: An Page MD    Physician Orders: Eval and Treat  Medical Diagnosis: Peroneal tendinitis of right lower extremity  Surgical Diagnosis: Not applicable for this Episode   Surgical Date: Not applicable for this Episode  Days Since Last Surgery: Not applicable for this Episode  Visit # / Visits Authorized:  1 / 20  Insurance Authorization Period: 6/17/2025 to 12/31/2025  Date of Evaluation: 6/17/2025  Plan of Care Certification: 6/17/2025 to 8/12/2025      PT/PTA:     Number of PTA visits since last PT visit:   Time In: 1728   Time Out: 1830  Total Time (in minutes): 62   Total Billable Time (in minutes):  60    FOTO:  Intake Score:  %  Survey Score 2:  %  Survey Score 3:  %    Precautions:       Subjective   Patient reports keeping up with HEP well without issue. Worked on putting up a pool today without issue..  Pain reported as 1/10. right ankle    Objective        Treatment:     CPT Intervention Performed   Today Duration / Intensity   MT Soft tissue massage x Right peroneals     Joint Mobilizations x Dorsal glides of cuboid, distraction          TE Bike x 7 minutes     4 Way Ankle x  x  x   Eversion x30 RTB  Inversion x30 RTB  Dorsiflexion x30 RTB  Plantar Flexion    Slant Board Stretch x 5 x 30"          NMR Seated Heel Raises x 3 x 112 with 55# SL     Single Leg Stance x 3 x 10 passes of 10# KB CW/CCW     Toe Crunches x 3 minutes          TA Heel Raises x 3 x 12, 2 up 1 down     Side Stepping x 3 laps of 10 steps pink loop around mid foot     Squats x 3 x 10 with 25# KB          PLAN Bike, 4 way ankle, heel raises, leg press, squats, side stepping with band around foot, " consider dry needling, gastroc stretch, soleus stretch       CPT Codes available for Billing:   (15) minutes of Manual therapy (MT) to improve pain and ROM.  (15) minutes of Therapeutic Exercise (TE) to develop strength, endurance, range of motion, and flexibility.  (12) minutes of Neuromuscular Re-Education (NMR)  to improve: Balance, Coordination, Kinesthetic, Sense, Proprioception, and Posture.  (14) minutes of Therapeutic Activities (TA) to improve functional performance.  Vasopneumatic Device Therapy () for management of swelling/edema. (19668)  Unattended Electrical Stimulation (ES) for muscle performance or pain modulation.  BFR: Blood flow restriction applied during exercise    Assessment & Plan   Assessment: Patient tolerated session well. Patient able to demonstrate home exercise plan well with mild cueing. Patient progressed with further foot/ankle strengthening and functional activities.      The patient will continue to benefit from skilled outpatient physical therapy in order to address the deficits listed in the problem list on the initial evaluation, provide patient and family education, and maximize the patients level of independence in the home and community environments.     The patient's spiritual, cultural, and educational needs were considered, and the patient is agreeable to the plan of care and goals.      Plan: Continue Plan of Care (POC) and progress per patient tolerance. See treatment section for details on planned progressions next session.    Goals:   Active       Functional outcome       Patient will show a significant change in FOTO patient-reported outcome tool to goal score to demonstrate subjective improvement       Start:  06/17/25    Expected End:  08/12/25            Patient will demonstrate independence in home program for support of progression       Start:  06/17/25    Expected End:  07/15/25               Pain       Patient will report pain of 0-1/10 demonstrating a  reduction of overall pain       Start:  06/17/25    Expected End:  08/12/25            Patient will report a 2 point reduction in pain.       Start:  06/17/25    Expected End:  07/15/25               Range of Motion       Patient will achieve bilateral ankle eversion ROM 10 degrees       Start:  06/17/25    Expected End:  08/12/25               Strength       Patient will achieve bilateral ankle eversion strength of 4+/5       Start:  06/17/25    Expected End:  08/12/25                Ed Steele, PT

## 2025-06-23 ENCOUNTER — CLINICAL SUPPORT (OUTPATIENT)
Dept: REHABILITATION | Facility: HOSPITAL | Age: 40
End: 2025-06-23
Payer: COMMERCIAL

## 2025-06-23 DIAGNOSIS — M25.571 ACUTE RIGHT ANKLE PAIN: ICD-10-CM

## 2025-06-23 DIAGNOSIS — M76.71 PERONEAL TENDINITIS OF RIGHT LOWER EXTREMITY: Primary | ICD-10-CM

## 2025-06-23 DIAGNOSIS — R29.898 WEAKNESS OF RIGHT LOWER EXTREMITY: ICD-10-CM

## 2025-06-23 PROCEDURE — 97112 NEUROMUSCULAR REEDUCATION: CPT

## 2025-06-23 PROCEDURE — 97110 THERAPEUTIC EXERCISES: CPT

## 2025-06-23 PROCEDURE — 97140 MANUAL THERAPY 1/> REGIONS: CPT

## 2025-06-23 PROCEDURE — 97530 THERAPEUTIC ACTIVITIES: CPT

## 2025-06-23 NOTE — PROGRESS NOTES
"  Outpatient Rehab    Physical Therapy Visit    Patient Name: Francoise Harris  MRN: [id number]  YOB: 1985  Encounter Date: 6/23/2025    Therapy Diagnosis:   Encounter Diagnoses   Name Primary?    Peroneal tendinitis of right lower extremity Yes    Weakness of right lower extremity     Acute right ankle pain      Physician: An Page MD    Physician Orders: Eval and Treat  Medical Diagnosis: Peroneal tendinitis of right lower extremity  Surgical Diagnosis: Not applicable for this Episode   Surgical Date: Not applicable for this Episode  Days Since Last Surgery: Not applicable for this Episode  Visit # / Visits Authorized:  2 / 20  Insurance Authorization Period: 6/17/2025 to 12/31/2025  Date of Evaluation: 6/17/2025  Plan of Care Certification: 6/17/2025 to 8/12/2025      PT/PTA:     Number of PTA visits since last PT visit:   Time In: 1059   Time Out: 1201  Total Time (in minutes): 62   Total Billable Time (in minutes):  60    FOTO:  Intake Score:  %  Survey Score 2:  %  Survey Score 3:  %    Precautions:     Subjective   Patient reports she felt pretty good this weekend. Mild anterior ankle/foot pain at one point over the dorsum of foot for short time when sitting with foot in end range inversion..  Pain reported as 0/10. right ankle    Objective        Treatment:     CPT Intervention Performed   Today Duration / Intensity   MT Soft tissue massage x Right peroneals     Joint Mobilizations  Dorsal glides of cuboid, distraction          TE Bike x 7 minutes     4 Way Ankle x  x  x   Eversion x30 RTB  Inversion x30 RTB  Dorsiflexion x30 RTB  Plantar Flexion    Slant Board Stretch x 5 x 30"    Side Lying Eversion x 3 x 15    Side Lying Inversion x 3 x 15          NMR Seated Heel Raises x 3 x 112 with 55# SL     Single Leg Stance x 3 x 10 passes of 10# KB CW/CCW     Toe Crunches x 3 minutes          TA Heel Raises x 3 x 12, 2 up 1 down     Side Stepping x 3 laps of 10 steps pink loop around mid foot     " Squats x 3 x 10 with 25# KB          PLAN Bike, 4 way ankle, heel raises, leg press, squats, side stepping with band around foot, consider dry needling, gastroc stretch, soleus stretch       CPT Codes available for Billing:   (08) minutes of Manual therapy (MT) to improve pain and ROM.  (18) minutes of Therapeutic Exercise (TE) to develop strength, endurance, range of motion, and flexibility.  (14) minutes of Neuromuscular Re-Education (NMR)  to improve: Balance, Coordination, Kinesthetic, Sense, Proprioception, and Posture.  (14) minutes of Therapeutic Activities (TA) to improve functional performance.  Vasopneumatic Device Therapy () for management of swelling/edema. (00028)  Unattended Electrical Stimulation (ES) for muscle performance or pain modulation.  BFR: Blood flow restriction applied during exercise    Assessment & Plan   Assessment: Patient tolerated session well. Patient progressed with performance of side lying inversion and eversion for continued improved peroneal strength. Good tolerance to functional training in today's session with noted fatigue and no noted pain.      The patient will continue to benefit from skilled outpatient physical therapy in order to address the deficits listed in the problem list on the initial evaluation, provide patient and family education, and maximize the patients level of independence in the home and community environments.     The patient's spiritual, cultural, and educational needs were considered, and the patient is agreeable to the plan of care and goals.      Plan: Continue Plan of Care (POC) and progress per patient tolerance. See treatment section for details on planned progressions next session.    Goals:   Active       Functional outcome       Patient will show a significant change in FOTO patient-reported outcome tool to goal score to demonstrate subjective improvement       Start:  06/17/25    Expected End:  08/12/25            Patient will demonstrate  independence in home program for support of progression       Start:  06/17/25    Expected End:  07/15/25               Pain       Patient will report pain of 0-1/10 demonstrating a reduction of overall pain       Start:  06/17/25    Expected End:  08/12/25            Patient will report a 2 point reduction in pain.       Start:  06/17/25    Expected End:  07/15/25               Range of Motion       Patient will achieve bilateral ankle eversion ROM 10 degrees       Start:  06/17/25    Expected End:  08/12/25               Strength       Patient will achieve bilateral ankle eversion strength of 4+/5       Start:  06/17/25    Expected End:  08/12/25                Ed Steele, PT

## 2025-06-30 ENCOUNTER — CLINICAL SUPPORT (OUTPATIENT)
Dept: REHABILITATION | Facility: HOSPITAL | Age: 40
End: 2025-06-30
Payer: COMMERCIAL

## 2025-06-30 DIAGNOSIS — M25.571 ACUTE RIGHT ANKLE PAIN: ICD-10-CM

## 2025-06-30 DIAGNOSIS — M76.71 PERONEAL TENDINITIS OF RIGHT LOWER EXTREMITY: Primary | ICD-10-CM

## 2025-06-30 DIAGNOSIS — R29.898 WEAKNESS OF RIGHT LOWER EXTREMITY: ICD-10-CM

## 2025-06-30 PROCEDURE — 97110 THERAPEUTIC EXERCISES: CPT

## 2025-06-30 PROCEDURE — 97112 NEUROMUSCULAR REEDUCATION: CPT

## 2025-06-30 PROCEDURE — 97530 THERAPEUTIC ACTIVITIES: CPT

## 2025-06-30 PROCEDURE — 97140 MANUAL THERAPY 1/> REGIONS: CPT

## 2025-06-30 NOTE — PROGRESS NOTES
"  Outpatient Rehab    Physical Therapy Visit    Patient Name: Francoise Harris  MRN: [id number]  YOB: 1985  Encounter Date: 6/30/2025    Therapy Diagnosis:   Encounter Diagnoses   Name Primary?    Peroneal tendinitis of right lower extremity Yes    Weakness of right lower extremity     Acute right ankle pain      Physician: An Page MD    Physician Orders: Eval and Treat  Medical Diagnosis: Peroneal tendinitis of right lower extremity  Surgical Diagnosis: Not applicable for this Episode   Surgical Date: Not applicable for this Episode  Days Since Last Surgery: Not applicable for this Episode  Visit # / Visits Authorized:  3 / 20  Insurance Authorization Period: 6/17/2025 to 12/31/2025  Date of Evaluation: 6/17/2025  Plan of Care Certification: 6/17/2025 to 8/12/2025      PT/PTA:     Number of PTA visits since last PT visit:   Time In: 1231   Time Out: 1332  Total Time (in minutes): 61   Total Billable Time (in minutes):  60    FOTO:  Intake Score:  %  Survey Score 2:  %  Survey Score 3:  %    Precautions:     Subjective   Patient reports having mild pain over the weekend with walking after about 10 minutes but passed as she kept going. No pain currently..  Pain reported as 0/10. right ankle    Objective        Treatment:     CPT Intervention Performed   Today Duration / Intensity   MT Soft tissue massage x Right peroneals     Joint Mobilizations  Dorsal glides of cuboid, distraction          TE Bike x 7 minutes     4 Way Ankle x  x  x   Eversion x30 RTB  Inversion x30 RTB  Dorsiflexion x30 RTB  Plantar Flexion    Slant Board Stretch x 5 x 30"    Side Lying Eversion x 3 x 15    Side Lying Inversion x 3 x 15          NMR Seated Heel Raises x 3 x 112 with 55# SL     Single Leg Stance x 3 x 10 passes of 10# KB CW/CCW     Toe Crunches x 3 minutes          TA Heel Raises x 3 x 12, 2 up 1 down     Side Stepping x 3 laps of 10 steps pink loop around mid foot     Squats x 3 x 10 with 25# KB          PLAN " Bike, 4 way ankle, heel raises, leg press, squats, side stepping with band around foot, consider dry needling, gastroc stretch, soleus stretch       CPT Codes available for Billing:   (09) minutes of Manual therapy (MT) to improve pain and ROM.  (24) minutes of Therapeutic Exercise (TE) to develop strength, endurance, range of motion, and flexibility.  (12) minutes of Neuromuscular Re-Education (NMR)  to improve: Balance, Coordination, Kinesthetic, Sense, Proprioception, and Posture.  (12) minutes of Therapeutic Activities (TA) to improve functional performance.  Vasopneumatic Device Therapy () for management of swelling/edema. (62099)  Unattended Electrical Stimulation (ES) for muscle performance or pain modulation.  BFR: Blood flow restriction applied during exercise    Assessment & Plan   Assessment: Patient tolerated session well. Patient tenderness to palpation decreased overall but continues to be mildly present of lateral leg. Mild pain noted with side lying eversion today as well.      The patient will continue to benefit from skilled outpatient physical therapy in order to address the deficits listed in the problem list on the initial evaluation, provide patient and family education, and maximize the patients level of independence in the home and community environments.     The patient's spiritual, cultural, and educational needs were considered, and the patient is agreeable to the plan of care and goals.      Plan: Continue Plan of Care (POC) and progress per patient tolerance. See treatment section for details on planned progressions next session.    Goals:   Active       Functional outcome       Patient will show a significant change in FOTO patient-reported outcome tool to goal score to demonstrate subjective improvement       Start:  06/17/25    Expected End:  08/12/25            Patient will demonstrate independence in home program for support of progression       Start:  06/17/25    Expected End:   07/15/25               Pain       Patient will report pain of 0-1/10 demonstrating a reduction of overall pain       Start:  06/17/25    Expected End:  08/12/25            Patient will report a 2 point reduction in pain.       Start:  06/17/25    Expected End:  07/15/25               Range of Motion       Patient will achieve bilateral ankle eversion ROM 10 degrees       Start:  06/17/25    Expected End:  08/12/25               Strength       Patient will achieve bilateral ankle eversion strength of 4+/5       Start:  06/17/25    Expected End:  08/12/25                Ed Steele, PT   Normal for race

## 2025-07-03 ENCOUNTER — CLINICAL SUPPORT (OUTPATIENT)
Dept: REHABILITATION | Facility: HOSPITAL | Age: 40
End: 2025-07-03
Payer: COMMERCIAL

## 2025-07-03 DIAGNOSIS — R29.898 WEAKNESS OF RIGHT LOWER EXTREMITY: ICD-10-CM

## 2025-07-03 DIAGNOSIS — M25.571 ACUTE RIGHT ANKLE PAIN: ICD-10-CM

## 2025-07-03 DIAGNOSIS — M76.71 PERONEAL TENDINITIS OF RIGHT LOWER EXTREMITY: Primary | ICD-10-CM

## 2025-07-03 PROCEDURE — 97110 THERAPEUTIC EXERCISES: CPT

## 2025-07-03 PROCEDURE — 97530 THERAPEUTIC ACTIVITIES: CPT

## 2025-07-03 PROCEDURE — 97112 NEUROMUSCULAR REEDUCATION: CPT

## 2025-07-03 PROCEDURE — 97140 MANUAL THERAPY 1/> REGIONS: CPT

## 2025-07-03 NOTE — PROGRESS NOTES
"  Outpatient Rehab    Physical Therapy Visit    Patient Name: Francoise Harris  MRN: [id number]  YOB: 1985  Encounter Date: 7/3/2025    Therapy Diagnosis:   Encounter Diagnoses   Name Primary?    Peroneal tendinitis of right lower extremity Yes    Weakness of right lower extremity     Acute right ankle pain      Physician: An Page MD    Physician Orders: Eval and Treat  Medical Diagnosis: Peroneal tendinitis of right lower extremity  Surgical Diagnosis: Not applicable for this Episode   Surgical Date: Not applicable for this Episode  Days Since Last Surgery: Not applicable for this Episode  Visit # / Visits Authorized:  4 / 20  Insurance Authorization Period: 6/17/2025 to 12/31/2025  Date of Evaluation: 6/17/2025  Plan of Care Certification: 6/17/2025 to 8/12/2025      PT/PTA:     Number of PTA visits since last PT visit:   Time In: 1720   Time Out: 1820  Total Time (in minutes): 60   Total Billable Time (in minutes):  60    FOTO:  Intake Score:  %  Survey Score 2:  %  Survey Score 3:  %    Precautions:     Subjective   Patient reports mild sorness and continued swelling in posteriolateral ankle. Symptoms overall better but pain at times..  Pain reported as 2/10. right ankle    Objective        Treatment:     CPT Intervention Performed   Today Duration / Intensity   MT Soft tissue massage x Right peroneals     Joint Mobilizations  Dorsal glides of cuboid, distraction          TE Bike x 7 minutes     4 Way Ankle x  x  x   Eversion x30 RTB  Inversion x30 RTB  Dorsiflexion x30 RTB  Plantar Flexion    Slant Board Stretch x 5 x 30"    Side Lying Eversion x 3 x 15    Side Lying Inversion x 3 x 15          NMR Seated Heel Raises x 3 x 12 with 55# SL     Single Leg Stance x 3 x 10 passes of 10# KB CW/CCW     Toe Crunches x 3 minutes          TA Heel Raises x 3 x 12, 2 up 1 down     Side Stepping x 3 laps of 10 steps pink loop around mid foot     Squats x 3 x 10 with 25# KB    Leg Press x 3 x 10 65# SL      "     PLAN Bike, 4 way ankle, heel raises, leg press, squats, side stepping with band around foot, consider dry needling, gastroc stretch, soleus stretch       CPT Codes available for Billing:   (10) minutes of Manual therapy (MT) to improve pain and ROM.  (24) minutes of Therapeutic Exercise (TE) to develop strength, endurance, range of motion, and flexibility.  (10) minutes of Neuromuscular Re-Education (NMR)  to improve: Balance, Coordination, Kinesthetic, Sense, Proprioception, and Posture.  (14) minutes of Therapeutic Activities (TA) to improve functional performance.  Vasopneumatic Device Therapy () for management of swelling/edema. (18167)  Unattended Electrical Stimulation (ES) for muscle performance or pain modulation.  BFR: Blood flow restriction applied during exercise    Assessment & Plan   Assessment: Patient tolerated session well. Patient progressed functionally with performance of single leg leg press with good tolerance and functional strength. Tenderness to palpation and mild swelling in right left ankle today as well.      The patient will continue to benefit from skilled outpatient physical therapy in order to address the deficits listed in the problem list on the initial evaluation, provide patient and family education, and maximize the patients level of independence in the home and community environments.     The patient's spiritual, cultural, and educational needs were considered, and the patient is agreeable to the plan of care and goals.      Plan: Continue Plan of Care (POC) and progress per patient tolerance. See treatment section for details on planned progressions next session.    Goals:   Active       Functional outcome       Patient will show a significant change in FOTO patient-reported outcome tool to goal score to demonstrate subjective improvement       Start:  06/17/25    Expected End:  08/12/25            Patient will demonstrate independence in home program for support of  progression       Start:  06/17/25    Expected End:  07/15/25               Pain       Patient will report pain of 0-1/10 demonstrating a reduction of overall pain       Start:  06/17/25    Expected End:  08/12/25            Patient will report a 2 point reduction in pain.       Start:  06/17/25    Expected End:  07/15/25               Range of Motion       Patient will achieve bilateral ankle eversion ROM 10 degrees       Start:  06/17/25    Expected End:  08/12/25               Strength       Patient will achieve bilateral ankle eversion strength of 4+/5       Start:  06/17/25    Expected End:  08/12/25                Ed Steele, PT

## 2025-07-07 ENCOUNTER — CLINICAL SUPPORT (OUTPATIENT)
Dept: REHABILITATION | Facility: HOSPITAL | Age: 40
End: 2025-07-07
Payer: COMMERCIAL

## 2025-07-07 DIAGNOSIS — M76.71 PERONEAL TENDINITIS OF RIGHT LOWER EXTREMITY: Primary | ICD-10-CM

## 2025-07-07 DIAGNOSIS — M25.571 ACUTE RIGHT ANKLE PAIN: ICD-10-CM

## 2025-07-07 DIAGNOSIS — R29.898 WEAKNESS OF RIGHT LOWER EXTREMITY: ICD-10-CM

## 2025-07-07 PROCEDURE — 97140 MANUAL THERAPY 1/> REGIONS: CPT

## 2025-07-07 PROCEDURE — 97530 THERAPEUTIC ACTIVITIES: CPT

## 2025-07-07 PROCEDURE — 97112 NEUROMUSCULAR REEDUCATION: CPT

## 2025-07-07 PROCEDURE — 97110 THERAPEUTIC EXERCISES: CPT

## 2025-07-07 NOTE — PROGRESS NOTES
"  Outpatient Rehab    Physical Therapy Visit    Patient Name: Francoise Harris  MRN: [id number]  YOB: 1985  Encounter Date: 7/7/2025    Therapy Diagnosis:   Encounter Diagnoses   Name Primary?    Peroneal tendinitis of right lower extremity Yes    Weakness of right lower extremity     Acute right ankle pain      Physician: An Page MD    Physician Orders: Eval and Treat  Medical Diagnosis: Peroneal tendinitis of right lower extremity  Surgical Diagnosis: Not applicable for this Episode   Surgical Date: Not applicable for this Episode  Days Since Last Surgery: Not applicable for this Episode  Visit # / Visits Authorized:  5 / 20  Insurance Authorization Period: 6/17/2025 to 12/31/2025  Date of Evaluation: 6/17/2025  Plan of Care Certification: 6/17/2025 to 8/12/2025      PT/PTA:     Number of PTA visits since last PT visit:   Time In: 1059   Time Out: 1157  Total Time (in minutes): 58   Total Billable Time (in minutes):  58    FOTO:  Intake Score:  %  Survey Score 2:  %  Survey Score 3:  %    Precautions:     Subjective   Patient reports she did a good amount of walking over the weekend and her ankle did not bother her during or after. No swelling that she noticed either..  Pain reported as 1/10. right ankle    Objective        Treatment:     CPT Intervention Performed   Today Duration / Intensity   MT Soft tissue massage x Right peroneals     Joint Mobilizations  Dorsal glides of cuboid, distraction          TE Bike x 7 minutes     4 Way Ankle x  x  x  x Eversion x30 GTB  Inversion x30 GTB  Dorsiflexion x30 GTB  Plantar Flexion x30 GTB    Slant Board Stretch x 5 x 30"    Side Lying Eversion x 3 x 15 with 1#    Side Lying Inversion x 3 x 15 with 1#          NMR Seated Heel Raises x 3 x 12 with 65# SL     Single Leg Stance x 3 x 10 passes of 10# KB CW/CCW     Toe Crunches x 3 minutes, standing single leg          TA Heel Raises x 3 x 12, 2 up 1 down     Side Stepping x 3 laps of 10 steps pink loop " around mid foot     Squats x 3 x 10 with 25# KB    Leg Press x 3 x 10 65# SL          PLAN Bike, 4 way ankle, heel raises, leg press, squats, side stepping with band around foot, consider dry needling, gastroc stretch, soleus stretch       CPT Codes available for Billing:   (10) minutes of Manual therapy (MT) to improve pain and ROM.  (24) minutes of Therapeutic Exercise (TE) to develop strength, endurance, range of motion, and flexibility.  (10) minutes of Neuromuscular Re-Education (NMR)  to improve: Balance, Coordination, Kinesthetic, Sense, Proprioception, and Posture.  (14) minutes of Therapeutic Activities (TA) to improve functional performance.  Vasopneumatic Device Therapy () for management of swelling/edema. (76449)  Unattended Electrical Stimulation (ES) for muscle performance or pain modulation.  BFR: Blood flow restriction applied during exercise    Assessment & Plan   Assessment: Patient tolerated session well. Patient progressed with increased resistance with seated heel raises while maintaining good range of motion. Patient notes increased tenderness to palpation today in proximal peroneals but reduced with soft tissue massage.      The patient will continue to benefit from skilled outpatient physical therapy in order to address the deficits listed in the problem list on the initial evaluation, provide patient and family education, and maximize the patients level of independence in the home and community environments.     The patient's spiritual, cultural, and educational needs were considered, and the patient is agreeable to the plan of care and goals.      Plan: Continue Plan of Care (POC) and progress per patient tolerance. See treatment section for details on planned progressions next session.    Goals:   Active       Functional outcome       Patient will show a significant change in FOTO patient-reported outcome tool to goal score to demonstrate subjective improvement       Start:  06/17/25     Expected End:  08/12/25            Patient will demonstrate independence in home program for support of progression       Start:  06/17/25    Expected End:  07/15/25               Pain       Patient will report pain of 0-1/10 demonstrating a reduction of overall pain       Start:  06/17/25    Expected End:  08/12/25            Patient will report a 2 point reduction in pain.       Start:  06/17/25    Expected End:  07/15/25               Range of Motion       Patient will achieve bilateral ankle eversion ROM 10 degrees       Start:  06/17/25    Expected End:  08/12/25               Strength       Patient will achieve bilateral ankle eversion strength of 4+/5       Start:  06/17/25    Expected End:  08/12/25                Ed Steele, PT

## 2025-07-10 ENCOUNTER — CLINICAL SUPPORT (OUTPATIENT)
Dept: REHABILITATION | Facility: HOSPITAL | Age: 40
End: 2025-07-10
Payer: COMMERCIAL

## 2025-07-10 DIAGNOSIS — R29.898 WEAKNESS OF RIGHT LOWER EXTREMITY: ICD-10-CM

## 2025-07-10 DIAGNOSIS — M76.71 PERONEAL TENDINITIS OF RIGHT LOWER EXTREMITY: Primary | ICD-10-CM

## 2025-07-10 DIAGNOSIS — M25.571 ACUTE RIGHT ANKLE PAIN: ICD-10-CM

## 2025-07-10 PROCEDURE — 97530 THERAPEUTIC ACTIVITIES: CPT

## 2025-07-10 PROCEDURE — 97140 MANUAL THERAPY 1/> REGIONS: CPT

## 2025-07-10 PROCEDURE — 97110 THERAPEUTIC EXERCISES: CPT

## 2025-07-10 PROCEDURE — 97112 NEUROMUSCULAR REEDUCATION: CPT

## 2025-07-11 NOTE — PROGRESS NOTES
"  Outpatient Rehab    Physical Therapy Visit    Patient Name: Francoise Harris  MRN: [id number]  YOB: 1985  Encounter Date: 7/10/2025    Therapy Diagnosis:   Encounter Diagnoses   Name Primary?    Peroneal tendinitis of right lower extremity Yes    Weakness of right lower extremity     Acute right ankle pain      Physician: An Page MD    Physician Orders: Eval and Treat  Medical Diagnosis: Peroneal tendinitis of right lower extremity  Surgical Diagnosis: Not applicable for this Episode   Surgical Date: Not applicable for this Episode  Days Since Last Surgery: Not applicable for this Episode  Visit # / Visits Authorized:  6 / 20  Insurance Authorization Period: 6/17/2025 to 12/31/2025  Date of Evaluation: 6/17/2025  Plan of Care Certification: 6/17/2025 to 8/12/2025      PT/PTA:     Number of PTA visits since last PT visit:   Time In: 1728   Time Out: 1835  Total Time (in minutes): 67   Total Billable Time (in minutes):  65    FOTO:  Intake Score:  %  Survey Score 2:  %  Survey Score 3:  %    Subjective   Patient reports she is doing good today in regards to right ankle. Played a softball game yesterday without any symptoms or swelling after. Notes left knee has been most symptomatic region..  Pain reported as 0/10. right ankle  Objective        Treatment:     CPT Intervention Performed   Today Duration / Intensity   MT Soft tissue massage x Right peroneals     Joint Mobilizations  Dorsal glides of cuboid, distraction          TE Bike x 7 minutes     4 Way Ankle x  x  x  x Eversion x30 GTB  Inversion x30 GTB  Dorsiflexion x30 GTB  Plantar Flexion x30 GTB    Slant Board Stretch x 4 x 30" holds    Kneeling Soleus Stretch x 4 x 30" holds    Side Lying Eversion x 3 x 15 with 2#    Side Lying Inversion x 3 x 15 with 2#                NMR Seated Heel Raises x 3 x 12 with 65# SL     Single Leg Stance x 3 x 10 passes of 10# KB CW/CCW     Toe Crunches x 3 minutes, standing single leg    MOBO Board x 2'/2' " standing          TA Heel Raises x 3 x 12, 2 up 1 down     Side Stepping x 3 laps of 10 steps pink loop around mid foot     Squats x 3 x 10 with 25# KB    Leg Press x 3 x 10 65# SL          PLAN consider dry needling       CPT Codes available for Billing:   (10) minutes of Manual therapy (MT) to improve pain and ROM.  (25) minutes of Therapeutic Exercise (TE) to develop strength, endurance, range of motion, and flexibility.  (15) minutes of Neuromuscular Re-Education (NMR)  to improve: Balance, Coordination, Kinesthetic, Sense, Proprioception, and Posture.  (15) minutes of Therapeutic Activities (TA) to improve functional performance.  Vasopneumatic Device Therapy () for management of swelling/edema. (79706)  Unattended Electrical Stimulation (ES) for muscle performance or pain modulation.  BFR: Blood flow restriction applied during exercise    Assessment & Plan   Assessment: Patient tolerated session well. Continues to have mild tenderness to palpation in left peroneals with soft tissue massage, reduced with manual techniques. Patient progressed with performance of MOBO board in standing.      The patient will continue to benefit from skilled outpatient physical therapy in order to address the deficits listed in the problem list on the initial evaluation, provide patient and family education, and maximize the patients level of independence in the home and community environments.     The patient's spiritual, cultural, and educational needs were considered, and the patient is agreeable to the plan of care and goals.      Plan: Continue Plan of Care (POC) and progress per patient tolerance. See treatment section for details on planned progressions next session.    Goals:   Active       Functional outcome       Patient will show a significant change in FOTO patient-reported outcome tool to goal score to demonstrate subjective improvement       Start:  06/17/25    Expected End:  08/12/25            Patient will  demonstrate independence in home program for support of progression       Start:  06/17/25    Expected End:  07/15/25               Pain       Patient will report pain of 0-1/10 demonstrating a reduction of overall pain       Start:  06/17/25    Expected End:  08/12/25            Patient will report a 2 point reduction in pain.       Start:  06/17/25    Expected End:  07/15/25               Range of Motion       Patient will achieve bilateral ankle eversion ROM 10 degrees       Start:  06/17/25    Expected End:  08/12/25               Strength       Patient will achieve bilateral ankle eversion strength of 4+/5       Start:  06/17/25    Expected End:  08/12/25                Ed Steele, PT

## 2025-07-14 ENCOUNTER — CLINICAL SUPPORT (OUTPATIENT)
Dept: REHABILITATION | Facility: HOSPITAL | Age: 40
End: 2025-07-14
Payer: COMMERCIAL

## 2025-07-14 DIAGNOSIS — M25.571 ACUTE RIGHT ANKLE PAIN: ICD-10-CM

## 2025-07-14 DIAGNOSIS — R29.898 WEAKNESS OF RIGHT LOWER EXTREMITY: ICD-10-CM

## 2025-07-14 DIAGNOSIS — M76.71 PERONEAL TENDINITIS OF RIGHT LOWER EXTREMITY: Primary | ICD-10-CM

## 2025-07-14 PROCEDURE — 97530 THERAPEUTIC ACTIVITIES: CPT

## 2025-07-14 PROCEDURE — 97112 NEUROMUSCULAR REEDUCATION: CPT

## 2025-07-14 PROCEDURE — 97110 THERAPEUTIC EXERCISES: CPT

## 2025-07-14 PROCEDURE — 97140 MANUAL THERAPY 1/> REGIONS: CPT

## 2025-07-14 NOTE — PROGRESS NOTES
"  Outpatient Rehab    Physical Therapy Visit    Patient Name: Francoise Harris  MRN: [id number]  YOB: 1985  Encounter Date: 7/14/2025    Therapy Diagnosis:   Encounter Diagnoses   Name Primary?    Peroneal tendinitis of right lower extremity Yes    Weakness of right lower extremity     Acute right ankle pain      Physician: An Page MD    Physician Orders: Eval and Treat  Medical Diagnosis: Peroneal tendinitis of right lower extremity  Surgical Diagnosis: Not applicable for this Episode   Surgical Date: Not applicable for this Episode  Days Since Last Surgery: Not applicable for this Episode  Visit # / Visits Authorized:  7 / 20  Insurance Authorization Period: 6/17/2025 to 12/31/2025  Date of Evaluation: 6/17/2025  Plan of Care Certification: 6/17/2025 to 8/12/2025      PT/PTA:     Number of PTA visits since last PT visit:   Time In: 1000   Time Out: 1108  Total Time (in minutes): 68   Total Billable Time (in minutes):  65    FOTO:  Intake Score:  %  Survey Score 2:  %  Survey Score 3:  %    Subjective   Patient reports ankle is feeling good. Notes she did a lot of walking and this did not bother her right ankle..  Pain reported as 0/10. right ankle  Objective        Treatment:     CPT Intervention Performed   Today Duration / Intensity   MT Soft tissue massage x Right peroneals     Joint Mobilizations  Dorsal glides of cuboid, distraction          TE Bike x 7 minutes     4 Way Ankle x  x  x  x Eversion x30 GTB  Inversion x30 GTB  Dorsiflexion x30 GTB  Plantar Flexion x30 GTB    Slant Board Stretch x 4 x 30" holds    Kneeling Soleus Stretch x 4 x 30" holds    Side Lying Eversion x 3 x 15 with 2#    Side Lying Inversion x 3 x 15 with 2#                NMR Seated Heel Raises x 3 x 12 with 65# SL     Single Leg Stance x 3 x 10 passes of 10# KB CW/CCW on foam     Toe Crunches x 3 minutes, standing single leg    MOBO Board x 2'/2' standing          TA Heel Raises x 3 x 12, 2 up 1 down     Side Stepping " x 3 laps of 10 steps pink loop around mid foot     Squats  3 x 10 with 25# KB    Leg Press x 3 x 10 65# SL          PLAN consider dry needling       CPT Codes available for Billing:   (14) minutes of Manual therapy (MT) to improve pain and ROM.  (24) minutes of Therapeutic Exercise (TE) to develop strength, endurance, range of motion, and flexibility.  (15) minutes of Neuromuscular Re-Education (NMR)  to improve: Balance, Coordination, Kinesthetic, Sense, Proprioception, and Posture.  (12) minutes of Therapeutic Activities (TA) to improve functional performance.  Vasopneumatic Device Therapy () for management of swelling/edema. (78071)  Unattended Electrical Stimulation (ES) for muscle performance or pain modulation.  BFR: Blood flow restriction applied during exercise    Assessment & Plan   Assessment: Patient tolerated session well. Patient progressed with balance training today to performance of single leg balance on foam for increased difficulty and demonstrates increased sway and noted fatigue. No pain noted with exercises performed today.      The patient will continue to benefit from skilled outpatient physical therapy in order to address the deficits listed in the problem list on the initial evaluation, provide patient and family education, and maximize the patients level of independence in the home and community environments.     The patient's spiritual, cultural, and educational needs were considered, and the patient is agreeable to the plan of care and goals.      Plan: Continue Plan of Care (POC) and progress per patient tolerance. See treatment section for details on planned progressions next session.    Goals:   Active       Functional outcome       Patient will show a significant change in FOTO patient-reported outcome tool to goal score to demonstrate subjective improvement       Start:  06/17/25    Expected End:  08/12/25            Patient will demonstrate independence in home program for  support of progression       Start:  06/17/25    Expected End:  07/15/25               Pain       Patient will report pain of 0-1/10 demonstrating a reduction of overall pain       Start:  06/17/25    Expected End:  08/12/25            Patient will report a 2 point reduction in pain.       Start:  06/17/25    Expected End:  07/15/25               Range of Motion       Patient will achieve bilateral ankle eversion ROM 10 degrees       Start:  06/17/25    Expected End:  08/12/25               Strength       Patient will achieve bilateral ankle eversion strength of 4+/5       Start:  06/17/25    Expected End:  08/12/25                Ed Steele, PT

## 2025-07-22 ENCOUNTER — CLINICAL SUPPORT (OUTPATIENT)
Dept: REHABILITATION | Facility: HOSPITAL | Age: 40
End: 2025-07-22
Payer: COMMERCIAL

## 2025-07-22 DIAGNOSIS — M76.71 PERONEAL TENDINITIS OF RIGHT LOWER EXTREMITY: Primary | ICD-10-CM

## 2025-07-22 DIAGNOSIS — R29.898 WEAKNESS OF RIGHT LOWER EXTREMITY: ICD-10-CM

## 2025-07-22 DIAGNOSIS — M25.571 ACUTE RIGHT ANKLE PAIN: ICD-10-CM

## 2025-07-22 PROCEDURE — 97530 THERAPEUTIC ACTIVITIES: CPT

## 2025-07-22 PROCEDURE — 97110 THERAPEUTIC EXERCISES: CPT

## 2025-07-22 PROCEDURE — 97112 NEUROMUSCULAR REEDUCATION: CPT

## 2025-07-22 PROCEDURE — 97140 MANUAL THERAPY 1/> REGIONS: CPT

## 2025-07-23 NOTE — PROGRESS NOTES
"  Outpatient Rehab    Physical Therapy Visit    Patient Name: Francoise Harris  MRN: [id number]  YOB: 1985  Encounter Date: 7/22/2025    Therapy Diagnosis:   Encounter Diagnoses   Name Primary?    Peroneal tendinitis of right lower extremity Yes    Weakness of right lower extremity     Acute right ankle pain      Physician: An Page MD    Physician Orders: Eval and Treat  Medical Diagnosis: Peroneal tendinitis of right lower extremity  Surgical Diagnosis: Not applicable for this Episode   Surgical Date: Not applicable for this Episode  Days Since Last Surgery: Not applicable for this Episode  Visit # / Visits Authorized:  8 / 20  Insurance Authorization Period: 6/17/2025 to 12/31/2025  Date of Evaluation: 6/17/2025  Plan of Care Certification: 6/17/2025 to 8/12/2025      PT/PTA:     Number of PTA visits since last PT visit:   Time In: 1627   Time Out: 1742  Total Time (in minutes): 75   Total Billable Time (in minutes):  70    FOTO:  Intake Score:  %  Survey Score 2:  %  Survey Score 3:  %    Subjective   Patient reports ankle continues to feel well today with minimal swelling..  Pain reported as 0/10. right ankle  Objective        Treatment:     CPT Intervention Performed   Today Duration / Intensity   MT Soft tissue massage x Right peroneals     Joint Mobilizations  Dorsal glides of cuboid, distraction          TE Bike x 7 minutes     4 Way Ankle x  x  x  x Eversion x30 Blue TB  Inversion x30 Blue TB  Dorsiflexion x30 Blue TB  Plantar Flexion x30 Black TB    Slant Board Stretch x 4 x 30" holds    Kneeling Soleus Stretch x 4 x 30" holds    Side Lying Eversion x 3 x 15 with 3#    Side Lying Inversion x 3 x 15 with 3#                NMR Seated Heel Raises x 3 x 12 with 65# SL     Single Leg Stance x 3 x 10 ball toss on foam     Toe Crunches  3 minutes, standing single leg    MOBO Board x 2'/2' standing          TA Heel Raises x  x 3 x 10 single leg standing  3 x 15 on leg press 65# SL     Side " Stepping x 3 laps of 10 steps pink loop around mid foot     Squats x 3 x 10 with 25# KB    Leg Press x 3 x 10 65# SL          PLAN consider dry needling       CPT Codes available for Billing:   (10) minutes of Manual therapy (MT) to improve pain and ROM.  (26) minutes of Therapeutic Exercise (TE) to develop strength, endurance, range of motion, and flexibility.  (12) minutes of Neuromuscular Re-Education (NMR)  to improve: Balance, Coordination, Kinesthetic, Sense, Proprioception, and Posture.  (24) minutes of Therapeutic Activities (TA) to improve functional performance.  Vasopneumatic Device Therapy () for management of swelling/edema. (02528)  Unattended Electrical Stimulation (ES) for muscle performance or pain modulation.  BFR: Blood flow restriction applied during exercise    Assessment & Plan   Assessment: Patient tolerated session well. Patient progressed with ankle strengthening with increased resistance with good tolerance. Mild tenderness to palpation in peroneals but noted to be improving well.      The patient will continue to benefit from skilled outpatient physical therapy in order to address the deficits listed in the problem list on the initial evaluation, provide patient and family education, and maximize the patients level of independence in the home and community environments.     The patient's spiritual, cultural, and educational needs were considered, and the patient is agreeable to the plan of care and goals.      Plan: Continue Plan of Care (POC) and progress per patient tolerance. See treatment section for details on planned progressions next session.    Goals:   Active       Functional outcome       Patient will show a significant change in FOTO patient-reported outcome tool to goal score to demonstrate subjective improvement       Start:  06/17/25    Expected End:  08/12/25            Patient will demonstrate independence in home program for support of progression       Start:  06/17/25     Expected End:  07/15/25               Pain       Patient will report pain of 0-1/10 demonstrating a reduction of overall pain       Start:  06/17/25    Expected End:  08/12/25            Patient will report a 2 point reduction in pain.       Start:  06/17/25    Expected End:  07/15/25               Range of Motion       Patient will achieve bilateral ankle eversion ROM 10 degrees       Start:  06/17/25    Expected End:  08/12/25               Strength       Patient will achieve bilateral ankle eversion strength of 4+/5       Start:  06/17/25    Expected End:  08/12/25                Ed Steele, PT

## 2025-07-24 ENCOUNTER — CLINICAL SUPPORT (OUTPATIENT)
Dept: REHABILITATION | Facility: HOSPITAL | Age: 40
End: 2025-07-24
Payer: COMMERCIAL

## 2025-07-24 DIAGNOSIS — M25.571 ACUTE RIGHT ANKLE PAIN: ICD-10-CM

## 2025-07-24 DIAGNOSIS — R29.898 WEAKNESS OF RIGHT LOWER EXTREMITY: ICD-10-CM

## 2025-07-24 DIAGNOSIS — M76.71 PERONEAL TENDINITIS OF RIGHT LOWER EXTREMITY: Primary | ICD-10-CM

## 2025-07-24 PROCEDURE — 97110 THERAPEUTIC EXERCISES: CPT

## 2025-07-24 PROCEDURE — 97530 THERAPEUTIC ACTIVITIES: CPT

## 2025-07-24 PROCEDURE — 97140 MANUAL THERAPY 1/> REGIONS: CPT

## 2025-07-24 PROCEDURE — 97112 NEUROMUSCULAR REEDUCATION: CPT

## 2025-07-24 NOTE — PROGRESS NOTES
"  Outpatient Rehab    Physical Therapy Visit    Patient Name: Francoise Harris  MRN: [id number]  YOB: 1985  Encounter Date: 7/24/2025    Therapy Diagnosis:   Encounter Diagnoses   Name Primary?    Peroneal tendinitis of right lower extremity Yes    Weakness of right lower extremity     Acute right ankle pain      Physician: An Page MD    Physician Orders: Eval and Treat  Medical Diagnosis: Peroneal tendinitis of right lower extremity  Surgical Diagnosis: Not applicable for this Episode   Surgical Date: Not applicable for this Episode  Days Since Last Surgery: Not applicable for this Episode  Visit # / Visits Authorized:  9 / 20  Insurance Authorization Period: 6/17/2025 to 12/31/2025  Date of Evaluation: 6/17/2025  Plan of Care Certification: 6/17/2025 to 8/12/2025      PT/PTA:     Number of PTA visits since last PT visit:   Time In: 1634   Time Out: 1733  Total Time (in minutes): 59   Total Billable Time (in minutes):  59    FOTO:  Intake Score:  %  Survey Score 2:  %  Survey Score 3:  %    Subjective   Patient repankle ankle is more sore today and in plantar surface of foot..  Pain reported as 1/10. right ankle  Objective        Treatment:     CPT Intervention Performed   Today Duration / Intensity   MT Soft tissue massage x Right peroneals     Joint Mobilizations  Dorsal glides of cuboid, distraction          TE Bike x 7 minutes     4 Way Ankle x  x  x  x Eversion x30 Blue TB  Inversion x30 Blue TB  Dorsiflexion x30 Blue TB  Plantar Flexion x30 Black TB    Slant Board Stretch x 4 x 30" holds    Soleus Stretch x 4 x 30" holds    Side Lying Eversion  3 x 15 with 3#    Side Lying Inversion  3 x 15 with 3#                NMR Seated Heel Raises x 3 x 12 with 75# SL     Single Leg Stance x 3 x 10 ball toss on foam     Toe Crunches x 3 minutes, standing single leg    MOBO Board  2'/2' standing          TA Heel Raises x  x 3 x 10 single leg standing  3 x 15 on leg press 75# SL     Side Stepping x 3 laps " of 10 steps pink loop around mid foot     Squats  3 x 10 with 25# KB    Leg Press x 3 x 10 75# SL          PLAN consider dry needling       CPT Codes available for Billing:   (09) minutes of Manual therapy (MT) to improve pain and ROM.  (16) minutes of Therapeutic Exercise (TE) to develop strength, endurance, range of motion, and flexibility.  (10) minutes of Neuromuscular Re-Education (NMR)  to improve: Balance, Coordination, Kinesthetic, Sense, Proprioception, and Posture.  (24) minutes of Therapeutic Activities (TA) to improve functional performance.  Vasopneumatic Device Therapy () for management of swelling/edema. (26844)  Unattended Electrical Stimulation (ES) for muscle performance or pain modulation.  BFR: Blood flow restriction applied during exercise    Assessment & Plan   Assessment: Patient tolerated session well with cramping of plantar intrinsics with single leg balance and single leg toe flexion. Soft tissue massage performed to peroneals and foot intrinsics with good reduction of tenderness.      The patient will continue to benefit from skilled outpatient physical therapy in order to address the deficits listed in the problem list on the initial evaluation, provide patient and family education, and maximize the patients level of independence in the home and community environments.     The patient's spiritual, cultural, and educational needs were considered, and the patient is agreeable to the plan of care and goals.      Plan: Continue Plan of Care (POC) and progress per patient tolerance. See treatment section for details on planned progressions next session.    Goals:   Active       Functional outcome       Patient will show a significant change in FOTO patient-reported outcome tool to goal score to demonstrate subjective improvement       Start:  06/17/25    Expected End:  08/12/25            Patient will demonstrate independence in home program for support of progression       Start:   06/17/25    Expected End:  07/15/25               Pain       Patient will report pain of 0-1/10 demonstrating a reduction of overall pain       Start:  06/17/25    Expected End:  08/12/25            Patient will report a 2 point reduction in pain.       Start:  06/17/25    Expected End:  07/15/25               Range of Motion       Patient will achieve bilateral ankle eversion ROM 10 degrees       Start:  06/17/25    Expected End:  08/12/25               Strength       Patient will achieve bilateral ankle eversion strength of 4+/5       Start:  06/17/25    Expected End:  08/12/25                Ed Steele, PT

## 2025-07-25 NOTE — PROGRESS NOTES
[address] [facility] [place] [address]   Phone [phone]  Fax [phone]           CHIEF COMPLAINT: Pain of the Right Ankle     HISTORY OF PRESENT ILLNESS JN (07/28/2025):  History of Present Illness    History of Present Illness    HPI:  Francoise presents for follow-up of ankle and knee issues. The ankle has improved, with swelling subsiding after the last visit due to medication and PT. She reports the ankle has not been bothersome, and swelling has not been noticeable for a few weeks. The course of meloxicam has been completed, and the lace-up brace is no longer used as symptoms improved.    Her left knee has ongoing issues stemming from ACL surgery and partial meniscectomy performed approximately 10 years ago. She reports constant knee pain and occasional swelling. Pain is localized to the posterior aspect of the knee, where fluid accumulation is perceived. She sometimes notices swelling, but other times has difficulty discerning if it is fluid accumulation or general discomfort.    The knee injury initially occurred during flag football, causing knee locking at night and difficulty unlocking it. Almost a year later, a reinjury caused significant swelling and limited mobility. An MRI revealed a complete ACL tear and a bucket handle tear of the meniscus, leading to surgery.    She was diagnosed with early-onset arthritis in 2018 or 2019, which was considered advanced for her age at the time. Previous treatments include a steroid injection in the knee, which provided relief. She uses KT tape for support and has recently obtained a slide-on brace with straps to help manage symptoms.           Disclaimer: The history above was obtained through ambient listening technology thus may contain errors.     HISTORY OF PRESENT ILLNESS (JN) (06/13/2025):    History of Present Illness    HPI:  Dipika presents with right ankle pain and swelling that began suddenly on Tuesday morning 06/03/2025.   "She denies any specific trauma.  Her pain is 2/10 today.. Pain was initially located in the anterior aspect of the ankle but has since improved. Swelling persists and increases by the end of the day, extending proximally. She reports difficulty flexing her ankle fully when walking and occasional tightness in her calf. Pain is exacerbated with flexion.    She denies any specific injury or change in activity prior to symptom onset, stating she only performed routine activities such as being around the house and grocery shopping on Monday. She denies any new workout routine.    She has attempted to manage symptoms with KT tape application. She reports favoring her right leg due to previous knee surgery on her left leg. She denies any previous ankle issues, though she recalls spraining her left ankle once in the past.    She works in a sedentary job, primarily sitting throughout the day.          Disclaimer: The history above was obtained through ambient listening technology thus may contain errors.     PAST MEDICAL HISTORY:    Past Medical History:   Diagnosis Date    Abnormal Pap smear of cervix 09/2020    ASCUS HPV positive    Chlamydia 10/2020    Hyperlipidemia     Obesity (BMI 30-39.9)        Patient's Latest A1C:No results found for: "LABA1C", "HGBA1C"       PAST SURGICAL HISTORY:    Past Surgical History:   Procedure Laterality Date    COLPOSCOPY  2020    KNEE ARTHROSCOPY W/ ACL RECONSTRUCTION Left 11/2015    With medial meniscal repair     TYMPANOSTOMY TUBE PLACEMENT      WISDOM TOOTH EXTRACTION  2003        MEDICATIONS:    Current Outpatient Medications:     meloxicam (MOBIC) 15 MG tablet, Take 1 tablet (15 mg total) by mouth once daily. with meals (Patient not taking: Reported on 7/28/2025), Disp: 14 tablet, Rfl: 1     ALLERGIES:     Review of patient's allergies indicates:   Allergen Reactions    Cefaclor Rash            FAMILY HISTORY:   Family History   Problem Relation Name Age of Onset    Breast cancer " Mother Evelyne 70        Negative genetic testing    Hypertension Mother Evelyne     Prostate cancer Father Pablo     Memory loss Father Pablo     Hypertension Father Pablo     Cancer Father Pablo         Prostate cancer, age 66. Radiation, never returned    Stroke Maternal Grandmother Diana     Breast cancer Paternal Grandmother Heather 70    Stroke Maternal Aunt      Breast cancer Maternal Aunt Nathalie Lloyd. Diagnosed in 20s, spread all over,  late 30s 1980s)    Stroke Maternal Aunt Ebony     Cancer Maternal Uncle Chidi         Pancreatic cancer, late 60s.  at 69    Breast cancer Paternal Aunt Andressa 62        Negative genetic testing           SOCIAL HISTORY:    Social History     Socioeconomic History    Marital status: Single   Tobacco Use    Smoking status: Never    Smokeless tobacco: Never   Substance and Sexual Activity    Alcohol use: Yes     Alcohol/week: 1.0 standard drink of alcohol     Types: 1 Glasses of wine per week     Comment: Thats more like in a 2-3 week time    Drug use: Never    Sexual activity: Yes     Partners: Male     Birth control/protection: Abstinence, Coitus interruptus, Condom   Social History Narrative    The patient is single and lives alone.  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     Social Drivers of Health     Financial Resource Strain: Low Risk  (2025)    Overall Financial Resource Strain (CARDIA)     Difficulty of Paying Living Expenses: Not very hard   Food Insecurity: No Food Insecurity (2025)    Hunger Vital Sign     Worried About Running Out of Food in the Last Year: Never true     Ran Out of Food in the Last Year: Never true   Transportation Needs: No Transportation Needs (2025)    PRAPARE - Transportation     Lack of Transportation (Medical): No     Lack of Transportation (Non-Medical): No   Physical Activity: Insufficiently Active (2025)    Exercise Vital Sign     Days of Exercise per Week: 2 days     Minutes of  "Exercise per Session: 20 min   Stress: Stress Concern Present (4/27/2025)    [employer] - Occupational Stress Questionnaire     Feeling of Stress : To some extent   Housing Stability: Low Risk  (4/27/2025)    Housing Stability Vital Sign     Unable to Pay for Housing in the Last Year: No     Number of Times Moved in the Last Year: 0     Homeless in the Last Year: No       PHYSICAL EXAMINATION:     Estimated body mass index is 40.1 kg/m² as calculated from the following:    Height as of 6/13/25: 5' 6" (1.676 m).    Weight as of this encounter: 112.7 kg (248 lb 7.3 oz).   ASSISTIVE DEVICE:  None    MUSCULOSKELETAL:    Ankle Exam (right extremity):   Inspection:         Gross deformity   (-)         Erythema   (-)        Ecchymosis   (-)          Swelling   (+) lateral hindfoot           Open wounds   (-)         Surgical scars   (-)                    Standing alignment:  Slight cavus  Palpation tenderness:  Bony:  Hindfoot:  Proximal fibula  (-)  Calcaneus  (-)   Distal fibula  (-)  Talus   (-)   Medial malleolus  (-)  CC joint/cuboid  (-)   Tibiotalar joint  (-)  Lateral gutter  (-)  Plantar fascia  (-)  Medial gutter  (-)   Midfoot:   TN joint   (-)   NC joints   (-)   TMT joints   (-)   Forefoot:   (-)      Soft tissue:     Tendons:    Achilles midsubstance (-)  Peroneal tendons  (-)   Achilles insertion  (-)  Posterior tibial tendon (-)   Retrocalcaneal bursa (-)  Anterior tibial tendon  (-)   Ligaments:   ATFL   (-)  Deltoid   (-)   CFL   (-)  Syndesmosis  (-)  ROM:  Affected Ankle:         DF:    10°        PF:    40°                 Pain    (-) with resisted eversion       Crepitance   (-)       Sensory:  Normal sensation to light touch in Sa/Jha/DP/SP/T nerve distributions      Motor:               Fires EHL/FHL/Tibialis anterior/Gastrocsoleus   Vascular:  Foot WWP with brisk capillary refill    DP/PT pulses palpable  Special Tests:  Krishna   (-)  Anterior " drawer   (-)  Calcaneal squeeze  (-)  Syndesmotic squeeze  (-)  No calf pain        IMAGING:      X-Ray Foot Complete Right  Narrative: EXAM: XR FOOT COMPLETE 3 VIEW RIGHT    CLINICAL INDICATION:   Pain in right foot    FINDINGS:  No comparison studies are available.  3 views of the right foot were submitted for interpretation.  Fusion of the middle distal phalanges of the fifth toe.  Small plantar and Achilles calcaneal spurs.  Small talar beak.  Subchondral cyst or erosion involves the first metatarsal head laterally.    Alignment is satisfactory. No     fractures, dislocations, or erosive arthritic change.  Negative for radiopaque foreign bodies or air in the soft tissues.  Impression: 1.  Negative for acute process involving the visualized osseous structures.  2.  Incidental findings as noted above.    Finalized on: 6/13/2025 11:06 AM By:  Demetris Reis MD  [facility]# [id number]      2025-06-13 11:08:30.159     [facility]  X-Ray Ankle Complete Right  Narrative: EXAM:  XR ANKLE COMPLETE 3 VIEW RIGHT    CLINICAL HISTORY:  Effusion.  Pain.    4 views right ankle.    FINDINGS: The bones, joint spaces and soft tissues are within normal limits. No acute fracture or subluxation.  Impression:  No acute fracture or dislocation.    Finalized on: 6/13/2025 10:59 AM By:  Ovidio Ramírez MD  [facility]# [id number]      2025-06-13 11:01:39.806     [facility]         ASSESSMENT: 40 y.o. female  with:   Right peroneal tendon inflammation/tearing  Cavovarus foot  History of left ACL reconstruction    PLAN:  Data:  I independently visualized images including but not limited to xrays, MRI, or CT scan today  I reviewed available old/outside records  PT/OT:  PT has been significantly helping her right ankle pain.  It will be renewed today  Medications:  Meloxicam was prescribed at her last visit.  DME and weightbearing status:  Lace-up ankle brace prescribed previously  Injections/Procedures/Surgey:  TBD  Advanced imaging:  MRI of right ankle not needed due  to improvement in pain  Work/school/disability note/status:  She is an  and has a sedentary job  Education:  I had a long discussion with the patient about the causes, treatments, and prognosis/natural history for this condition.  We discussed effective ways to improve symptoms as well as what types of activities may make the symptoms/prognosis worse. We discussed signs and symptoms and other reasons to return to clinic sooner.  Return to clinic:  In 4-6 weeks  Images needed at next visit:  Four views of left knee if she wants her knee formally worked up    This note was generated with the assistance of ambient listening technology thus some errors may exist.  Please contact the author of this note for any clarification.           Physician Signature: An Page M.D.       Official Website  Schedule An Appointment

## 2025-07-28 ENCOUNTER — OFFICE VISIT (OUTPATIENT)
Dept: ORTHOPEDICS | Facility: CLINIC | Age: 40
End: 2025-07-28
Payer: COMMERCIAL

## 2025-07-28 ENCOUNTER — CLINICAL SUPPORT (OUTPATIENT)
Dept: REHABILITATION | Facility: HOSPITAL | Age: 40
End: 2025-07-28
Payer: COMMERCIAL

## 2025-07-28 VITALS — BODY MASS INDEX: 40.1 KG/M2 | WEIGHT: 248.44 LBS

## 2025-07-28 DIAGNOSIS — M76.71 PERONEAL TENDINITIS OF RIGHT LOWER EXTREMITY: ICD-10-CM

## 2025-07-28 DIAGNOSIS — M76.71 PERONEAL TENDINITIS OF RIGHT LOWER EXTREMITY: Primary | ICD-10-CM

## 2025-07-28 DIAGNOSIS — G89.29 CHRONIC PAIN OF LEFT KNEE: Primary | ICD-10-CM

## 2025-07-28 DIAGNOSIS — M21.6X1 ACQUIRED CAVOVARUS FOOT DEFORMITY, RIGHT: ICD-10-CM

## 2025-07-28 DIAGNOSIS — M25.562 LEFT KNEE PAIN: ICD-10-CM

## 2025-07-28 DIAGNOSIS — M25.571 ACUTE RIGHT ANKLE PAIN: ICD-10-CM

## 2025-07-28 DIAGNOSIS — R29.898 WEAKNESS OF BOTH LOWER EXTREMITIES: ICD-10-CM

## 2025-07-28 DIAGNOSIS — M25.562 CHRONIC PAIN OF LEFT KNEE: Primary | ICD-10-CM

## 2025-07-28 PROCEDURE — 99999 PR PBB SHADOW E&M-EST. PATIENT-LVL III: CPT | Mod: PBBFAC,,, | Performed by: ORTHOPAEDIC SURGERY

## 2025-07-28 PROCEDURE — 3008F BODY MASS INDEX DOCD: CPT | Mod: CPTII,S$GLB,, | Performed by: ORTHOPAEDIC SURGERY

## 2025-07-28 PROCEDURE — 99214 OFFICE O/P EST MOD 30 MIN: CPT | Mod: S$GLB,,, | Performed by: ORTHOPAEDIC SURGERY

## 2025-07-28 PROCEDURE — 97164 PT RE-EVAL EST PLAN CARE: CPT

## 2025-07-28 PROCEDURE — 97112 NEUROMUSCULAR REEDUCATION: CPT

## 2025-07-28 PROCEDURE — 97110 THERAPEUTIC EXERCISES: CPT

## 2025-07-28 PROCEDURE — 1159F MED LIST DOCD IN RCRD: CPT | Mod: CPTII,S$GLB,, | Performed by: ORTHOPAEDIC SURGERY

## 2025-07-28 NOTE — PROGRESS NOTES
Outpatient Rehab    Physical Therapy Progress Note : Updated Plan of Care Re-Evaluation    Patient Name: Francoise Harris  MRN: [id number]  YOB: 1985  Encounter Date: 7/28/2025    Therapy Diagnosis:   Encounter Diagnoses   Name Primary?    Chronic pain of left knee Yes    Peroneal tendinitis of right lower extremity     Weakness of both lower extremities     Acute right ankle pain     Left knee pain      Physician: An Page MD    Physician Orders: Eval and Treat  Medical Diagnosis: Peroneal tendinitis of right lower extremity  Left knee pain  Peroneal tendinitis of right lower extremity  Surgical Diagnosis: Not applicable for this Episode   Surgical Date: Not applicable for this Episode  Days Since Last Surgery: Not applicable for this Episode  Visit # / Visits Authorized:  10 / 20  Insurance Authorization Period: 6/17/2025 to 12/31/2025  Date of Evaluation: 6/17/2025  Plan of Care Certification: 6/17/2025 to 8/12/2025 New: 7/28/2025 to 9/22/2025     PT/PTA:     Number of PTA visits since last PT visit:   Time In: 0959   Time Out: 1115  Total Time (in minutes): 76   Total Billable Time (in minutes): 65     Subjective   Pt was playing flag football in 2015 and heard a pop, no torn ACL, felt locking walked on it for a year. 2016 bucket handle tear of meniscus and ACL. November 2016 ACL repair and meniscus surgery. . Steroid shot helped with swelling. Still gets swelling in back of knee, hard to flex and plant foot when throwing, hitting, running. Notes increased pain with standing or walking for standing periods. Reports right knee with buckle at times if she plants on it. Reports crepitus as well..    Right ankle: 0/10, Left Knee: 5/10 average, 6/10 with activity  Objective      RANGE OF MOTION:     Knee AROM/PROM Right Left Pain/Dysfunction with Movement Goal   Knee Flexion (135º) 125 115  125   Knee Extension (0º) 0 0         0      Ankle/Foot AROM/PROM Right Left Pain/Dysfunction with Movement  "Goal   Dorsiflexion (20º) 15 20   20   Plantarflexion (50º) 45 50   50   Inversion (35º) 30 35   35   Eversion (15º) 10 10   10      STRENGTH:   L/E MMT Right  (spine) Left Pain/Dysfunction with Movement Goal   Hip Extension 4/5 4/5  4+/5 B   Hip Abduction 5/5 4+/5  5/5 B   Knee Extension 5/5 4+/5 R: 89 lbs, L: 82 lbs LSI 92%  5/5 B   Knee Flexion 5/5 4+/5   5/5 B   Ankle DF 4+/5 5/5   5/5 B   Ankle PF 4/5 5/5   5/5 B   Ankle Inversion 4/5 5/5   5/5 B   Ankle Eversion 4/5 5/5  5/5 B      MUSCLE LENGTH:   Muscle Tested  Right Left  Limitation Goal   Gastrocnemius  [] Normal  [x] Limited [] Normal  [] Limited   Normal B   Soleus  [] Normal  [x] Limited [] Normal  [] Limited   Normal B     GAIT ANALYSIS The patient ambulated with the following assistive device: none; the pt presents with the following gait abnormalities: antalgic gait with weight shift to right lower extremity.     Functional Testin second Heel Taps on 6" Step: R - 19, L - 2 (limited by pain and buckling)    Function:     Intake Outcome Measure for FOTO Knee Survey     Therapist reviewed FOTO scores for Francoise on 2025.   FOTO report - see Media section or FOTO account for episode details     Intake Score: 52%     Intake Outcome Measure for FOTO Ankle Survey     Therapist reviewed FOTO scores for Francoise on 2025.   FOTO report - see Media section or FOTO account for episode details     Intake Score: 78%          Treatment:     CPT Intervention Performed   Today Duration / Intensity   MT Soft tissue massage  Right peroneals     Joint Mobilizations  Dorsal glides of cuboid, distraction          TE Bike x 7 minutes     4 Way Ankle  Eversion x30 Blue TB  Inversion x30 Blue TB  Dorsiflexion x30 Blue TB  Plantar Flexion x30 Black TB    Slant Board Stretch  4 x 30" holds    Soleus Stretch  4 x 30" holds    Side Lying Eversion  3 x 15 with 3#    Side Lying Inversion  3 x 15 with 3#    BFR   x Occlusion 80% x 30, 3 x 15  LAQ, 0#          Objective " Testing x Measurements, FOTO, education          NMR Seated Heel Raises  3 x 12 with 75# SL     Single Leg Stance  3 x 10 ball toss on foam     Toe Crunches  3 minutes, standing single leg    MOBO Board  2'/2' standing    Prone Hip Extension x 3 x 10 bilateral 3#     Side Lying Hip Abduction x 3 x 10 bilateral yellow band                TA Heel Raises  3 x 10 single leg standing  3 x 15 on leg press 75# SL     Side Stepping  3 laps of 10 steps pink loop around mid foot     Squats  3 x 10 with 25# KB    Leg Press  3 x 10 75# SL          PLAN        CPT Codes available for Billing:   (00) minutes of Manual therapy (MT) to improve pain and ROM.  (25) minutes of Therapeutic Exercise (TE) to develop strength, endurance, range of motion, and flexibility.  (10) minutes of Neuromuscular Re-Education (NMR)  to improve: Balance, Coordination, Kinesthetic, Sense, Proprioception, and Posture.  (00) minutes of Therapeutic Activities (TA) to improve functional performance.  Vasopneumatic Device Therapy () for management of swelling/edema. (43506)  Unattended Electrical Stimulation (ES) for muscle performance or pain modulation.  BFR: Blood flow restriction applied during exercise    Assessment & Plan   Assessment: Pt is a 39 y/o female c/o L chronic knee pain following ACL repair and meniscus surgery in 2016. Patient presents with deficits in strength and range of motion along with pain during functional activities.    Francoise presents with a condition of Moderate complexity.   Presentation of Symptoms: Evolving  Will Comorbidities Impact Care: Yes  Past Medical History: 09/2020: Abnormal Pap smear of cervix     Comment:  ASCUS HPV positive 10/2020: Chlamydia No date: Hyperlipidemia No date: Obesity (BMI 30-39.9)   Impairments: Range of motion, Standing tolerance, Squatting  Impairments: Pain with functional activity, Impaired physical strength, Lack of appropriate home exercise program, Impaired balance, Activity intolerance,  Abnormal or restricted range of motion, Abnormal gait  Personal Factors Affecting Prognosis: Pain  The patient will continue to benefit from skilled outpatient physical therapy in order to address the deficits listed in the problem list on the initial evaluation, provide patient and family education, and maximize the patients level of independence in the home and community environments.     The patient's spiritual, cultural, and educational needs were considered, and the patient is agreeable to the plan of care and goals.      Plan:  From a physical therapy perspective, the patient would benefit from: Skilled Rehab Services     Planned therapy interventions include: Therapeutic exercise, Therapeutic activities, Neuromuscular re-education, Manual therapy, Aquatic therapy, and Gait training.    Planned modalities to include: Cryotherapy (cold pack), Electrical stimulation - attended, Electrical stimulation - passive/unattended, Mechanical traction, Thermotherapy (hot pack), Ultrasound, and Vasopneumatic pump.         Visit Frequency: 2 times Per Week for 8 Weeks.        This plan was discussed with Patient.   Discussion participants: Agreed Upon Plan of Care  Plan details: Outpatient Physical Therapy to include any combination of the following interventions: virtual visits, dry needling, modalities, electrical stimulation (IFC, Pre-Mod, Attended with Functional Dry Needling), Cervical/Lumbar Traction, Electrical Stimulation, Gait Training, Manual Therapy, Moist Heat/ Ice, Neuromuscular Re-ed, Patient Education, Self Care, Therapeutic Exercise, and Therapeutic Activites       Goals:   Active       Functional outcome       Patient will show a significant change in FOTO patient-reported outcome tool to goal score to demonstrate subjective improvement (Progressing)       Start:  06/17/25    Expected End:  08/12/25            Patient will demonstrate independence in home program for support of progression (Met)        Start:  06/17/25    Expected End:  07/15/25    Resolved:  07/28/25            Functional outcome       Patient will show a significant change in knee FOTO patient-reported outcome tool to goal score to demonstrate subjective improvement       Start:  07/28/25    Expected End:  09/22/25            Patient will achieve 10 heel taps on L knee for 30 second heel tap functional test       Start:  07/28/25    Expected End:  09/22/25               Pain       Patient will report knee pain of 1-2/10 demonstrating a reduction of overall pain       Start:  07/28/25    Expected End:  09/22/25            Patient will report a 2 point reduction in knee pain.       Start:  07/28/25    Expected End:  08/25/25               Range of Motion       Patient will achieve bilateral knee ROM of  0-125 degrees        Start:  07/28/25    Expected End:  09/22/25               Strength       Patient will achieve bilateral ankle eversion strength of 4+/5 (Progressing)       Start:  06/17/25    Expected End:  08/12/25               Strength       Patient will achieve bilateral hip extension strength of 4+/5       Start:  07/28/25    Expected End:  09/22/25              Resolved       Pain       Patient will report pain of 0-1/10 demonstrating a reduction of overall pain (Met)       Start:  06/17/25    Expected End:  08/12/25    Resolved:  07/28/25         Patient will report a 2 point reduction in pain. (Met)       Start:  06/17/25    Expected End:  07/15/25    Resolved:  07/28/25            Range of Motion       Patient will achieve bilateral ankle eversion ROM 10 degrees (Met)       Start:  06/17/25    Expected End:  08/12/25    Resolved:  07/28/25             Ed Steele, PT

## 2025-07-31 ENCOUNTER — CLINICAL SUPPORT (OUTPATIENT)
Dept: REHABILITATION | Facility: HOSPITAL | Age: 40
End: 2025-07-31
Payer: COMMERCIAL

## 2025-07-31 DIAGNOSIS — R29.898 WEAKNESS OF BOTH LOWER EXTREMITIES: ICD-10-CM

## 2025-07-31 DIAGNOSIS — M76.71 PERONEAL TENDINITIS OF RIGHT LOWER EXTREMITY: Primary | ICD-10-CM

## 2025-07-31 DIAGNOSIS — M25.571 ACUTE RIGHT ANKLE PAIN: ICD-10-CM

## 2025-07-31 DIAGNOSIS — M25.562 CHRONIC PAIN OF LEFT KNEE: ICD-10-CM

## 2025-07-31 DIAGNOSIS — G89.29 CHRONIC PAIN OF LEFT KNEE: ICD-10-CM

## 2025-07-31 PROCEDURE — 97112 NEUROMUSCULAR REEDUCATION: CPT

## 2025-07-31 PROCEDURE — 97110 THERAPEUTIC EXERCISES: CPT

## 2025-08-01 NOTE — PROGRESS NOTES
"  Outpatient Rehab    Physical Therapy Visit     Patient Name: Francoise Harris  MRN: [id number]  YOB: 1985  Encounter Date: 7/31/2025    Therapy Diagnosis:   Encounter Diagnoses   Name Primary?    Peroneal tendinitis of right lower extremity Yes    Weakness of both lower extremities     Acute right ankle pain     Chronic pain of left knee      Physician: An Page MD    Physician Orders: Eval and Treat  Medical Diagnosis: Peroneal tendinitis of right lower extremity  Surgical Diagnosis: Not applicable for this Episode   Surgical Date: Not applicable for this Episode  Days Since Last Surgery: Not applicable for this Episode  Visit # / Visits Authorized:  11 / 20  Insurance Authorization Period: 6/17/2025 to 12/31/2025  Date of Evaluation: 6/17/2025  Plan of Care Certification: 7/28/2025 to 9/22/2025 New: 7/28/2025 to 9/22/2025     PT/PTA:     Number of PTA visits since last PT visit:   Time In: 1744   Time Out: 1832  Total Time (in minutes): 48   Total Billable Time (in minutes):  48    Subjective   Patient reports she was in pain in her for the last 2 days. Was unable to play in her softball game that night. Pt starting to feel better today but pain continues at moderate levels..    Right ankle: 0/10, Left Knee: 6/10  Objective        Treatment:     CPT Intervention Performed   Today Duration / Intensity   MT Soft tissue massage  Right peroneals     Joint Mobilizations  Dorsal glides of cuboid, distraction          TE Bike x 7 minutes     4 Way Ankle  Eversion x30 Blue TB  Inversion x30 Blue TB  Dorsiflexion x30 Blue TB  Plantar Flexion x30 Black TB    Slant Board Stretch  4 x 30" holds    Soleus Stretch  4 x 30" holds    Side Lying Eversion  3 x 15 with 3#    Side Lying Inversion  3 x 15 with 3#    BFR   x  x Occlusion 80% x 30, 3 x 15  LAQ, 3#  Leg Press 25#          Objective Testing  Measurements, FOTO, education          NMR Seated Heel Raises  3 x 12 with 75# SL     Single Leg Stance  3 x 10 " ball toss on foam     Toe Crunches  3 minutes, standing single leg    MOBO Board  2'/2' standing    Prone Hip Extension x 3 x 10 bilateral 3#     Side Lying Hip Abduction x 3 x 10 bilateral red band                TA Heel Raises  3 x 10 single leg standing  3 x 15 on leg press 75# SL     Side Stepping  3 laps of 10 steps pink loop around mid foot     Squats  3 x 10 with 25# KB    Leg Press  3 x 10 75# SL          PLAN        CPT Codes available for Billing:   (00) minutes of Manual therapy (MT) to improve pain and ROM.  (39) minutes of Therapeutic Exercise (TE) to develop strength, endurance, range of motion, and flexibility.  (09) minutes of Neuromuscular Re-Education (NMR)  to improve: Balance, Coordination, Kinesthetic, Sense, Proprioception, and Posture.  (00) minutes of Therapeutic Activities (TA) to improve functional performance.  Vasopneumatic Device Therapy () for management of swelling/edema. (17383)  Unattended Electrical Stimulation (ES) for muscle performance or pain modulation.  BFR: Blood flow restriction applied during exercise    Assessment & Plan   Assessment: Patient tolerated session well with noted fatigue. Blood flow restriction performed today in order to challenge quad without excessively loading the knee.      The patient will continue to benefit from skilled outpatient physical therapy in order to address the deficits listed in the problem list on the initial evaluation, provide patient and family education, and maximize the patients level of independence in the home and community environments.     The patient's spiritual, cultural, and educational needs were considered, and the patient is agreeable to the plan of care and goals.      Plan: Continue Plan of Care (POC) and progress per patient tolerance. See treatment section for details on planned progressions next session.From a physical therapy perspective, the patient would benefit from:     Goals:   Active       Functional outcome        Patient will show a significant change in FOTO patient-reported outcome tool to goal score to demonstrate subjective improvement (Progressing)       Start:  06/17/25    Expected End:  08/12/25            Patient will demonstrate independence in home program for support of progression (Met)       Start:  06/17/25    Expected End:  07/15/25    Resolved:  07/28/25            Functional outcome       Patient will show a significant change in knee FOTO patient-reported outcome tool to goal score to demonstrate subjective improvement       Start:  07/28/25    Expected End:  09/22/25            Patient will achieve 10 heel taps on L knee for 30 second heel tap functional test       Start:  07/28/25    Expected End:  09/22/25               Pain       Patient will report knee pain of 1-2/10 demonstrating a reduction of overall pain       Start:  07/28/25    Expected End:  09/22/25            Patient will report a 2 point reduction in knee pain.       Start:  07/28/25    Expected End:  08/25/25               Range of Motion       Patient will achieve bilateral knee ROM of  0-125 degrees        Start:  07/28/25    Expected End:  09/22/25               Strength       Patient will achieve bilateral ankle eversion strength of 4+/5 (Progressing)       Start:  06/17/25    Expected End:  08/12/25               Strength       Patient will achieve bilateral hip extension strength of 4+/5       Start:  07/28/25    Expected End:  09/22/25              Resolved       Pain       Patient will report pain of 0-1/10 demonstrating a reduction of overall pain (Met)       Start:  06/17/25    Expected End:  08/12/25    Resolved:  07/28/25         Patient will report a 2 point reduction in pain. (Met)       Start:  06/17/25    Expected End:  07/15/25    Resolved:  07/28/25            Range of Motion       Patient will achieve bilateral ankle eversion ROM 10 degrees (Met)       Start:  06/17/25    Expected End:  08/12/25    Resolved:   07/28/25             Ed Steele, PT

## 2025-08-04 ENCOUNTER — CLINICAL SUPPORT (OUTPATIENT)
Dept: REHABILITATION | Facility: HOSPITAL | Age: 40
End: 2025-08-04
Payer: COMMERCIAL

## 2025-08-04 DIAGNOSIS — R29.898 WEAKNESS OF BOTH LOWER EXTREMITIES: ICD-10-CM

## 2025-08-04 DIAGNOSIS — M25.571 ACUTE RIGHT ANKLE PAIN: ICD-10-CM

## 2025-08-04 DIAGNOSIS — M76.71 PERONEAL TENDINITIS OF RIGHT LOWER EXTREMITY: Primary | ICD-10-CM

## 2025-08-04 DIAGNOSIS — G89.29 CHRONIC PAIN OF LEFT KNEE: ICD-10-CM

## 2025-08-04 DIAGNOSIS — M25.562 CHRONIC PAIN OF LEFT KNEE: ICD-10-CM

## 2025-08-04 PROCEDURE — 97110 THERAPEUTIC EXERCISES: CPT

## 2025-08-04 PROCEDURE — 97530 THERAPEUTIC ACTIVITIES: CPT

## 2025-08-04 PROCEDURE — 97112 NEUROMUSCULAR REEDUCATION: CPT

## 2025-08-05 NOTE — PROGRESS NOTES
"  Outpatient Rehab    Physical Therapy Visit     Patient Name: Francoise Harris  MRN: [id number]  YOB: 1985  Encounter Date: 8/4/2025    Therapy Diagnosis:   Encounter Diagnoses   Name Primary?    Peroneal tendinitis of right lower extremity Yes    Weakness of both lower extremities     Acute right ankle pain     Chronic pain of left knee      Physician: An Page MD    Physician Orders: Eval and Treat  Medical Diagnosis: Peroneal tendinitis of right lower extremity  Surgical Diagnosis: Not applicable for this Episode   Surgical Date: Not applicable for this Episode  Days Since Last Surgery: Not applicable for this Episode  Visit # / Visits Authorized:  12 / 20  Insurance Authorization Period: 6/17/2025 to 12/31/2025  Date of Evaluation: 6/17/2025  Plan of Care Certification: 7/28/2025 to 9/22/2025     PT/PTA:     Number of PTA visits since last PT visit:   Time In: 1429   Time Out: 1533  Total Time (in minutes): 64   Total Billable Time (in minutes):  58    Subjective   Patient reports continued knee pain. Did a moderate amount of walking over the weekend that was fatiguing and mild knee soreness..  Pain reported as 5/10. Right ankle: 0/10, Left Knee: 5/10  Objective        Treatment:     CPT Intervention Performed   Today Duration / Intensity   MT Soft tissue massage x Medial hamstring, adductors     Joint Mobilizations  Dorsal glides of cuboid, distraction          TE Bike x 7 minutes    Prone Quad Stretch x 5 x 30" with strap, left    Hamstring Stretch x 5 x 30" with strap, left     4 Way Ankle  Eversion x30 Blue TB  Inversion x30 Blue TB  Dorsiflexion x30 Blue TB  Plantar Flexion x30 Black TB    Slant Board Stretch  4 x 30" holds    Soleus Stretch  4 x 30" holds    Side Lying Eversion  3 x 15 with 3#    Side Lying Inversion  3 x 15 with 3#    BFR   x  x   Occlusion 80% x 30, 3 x 15  LAQ, 3#  Mini Sit to Stand  Leg Press 25#          Objective Testing  Measurements, FOTO, education          NMR " Seated Heel Raises  3 x 12 with 75# SL     Single Leg Stance  3 x 10 ball toss on foam     Toe Crunches  3 minutes, standing single leg    MOBO Board  2'/2' standing    Prone Hip Extension x 3 x 10 bilateral 3#     Side Lying Hip Abduction x 3 x 10 bilateral red band                TA Heel Raises  3 x 10 single leg standing  3 x 15 on leg press 75# SL     Side Stepping  3 laps of 10 steps pink loop around mid foot     Squats  3 x 10 with 25# KB    Leg Press  3 x 10 75# SL          PLAN        CPT Codes available for Billing:   (18) minutes of Manual therapy (MT) to improve pain and ROM.  (30) minutes of Therapeutic Exercise (TE) to develop strength, endurance, range of motion, and flexibility.  (09) minutes of Neuromuscular Re-Education (NMR)  to improve: Balance, Coordination, Kinesthetic, Sense, Proprioception, and Posture.  (00) minutes of Therapeutic Activities (TA) to improve functional performance.  Vasopneumatic Device Therapy () for management of swelling/edema. (79054)  Unattended Electrical Stimulation (ES) for muscle performance or pain modulation.  BFR: Blood flow restriction applied during exercise    Assessment & Plan   Assessment: Patient tolerated session fair secondary to continued knee pain. Ligamentous testing performed with no signs of laxity or pain provocation. Tenderness to palpation noted in medial, distal hamstring. Soft tissue massage performed with decreased tenderness to palpation noted following. Blood flow restriction therapy performed again today in order to challenge quad without loading the joint with good fatigue noted.      The patient will continue to benefit from skilled outpatient physical therapy in order to address the deficits listed in the problem list on the initial evaluation, provide patient and family education, and maximize the patients level of independence in the home and community environments.     The patient's spiritual, cultural, and educational needs were  considered, and the patient is agreeable to the plan of care and goals.      Plan: Continue Plan of Care (POC) and progress per patient tolerance. See treatment section for details on planned progressions next session.From a physical therapy perspective, the patient would benefit from:     Goals:   Active       Functional outcome       Patient will show a significant change in FOTO patient-reported outcome tool to goal score to demonstrate subjective improvement (Progressing)       Start:  06/17/25    Expected End:  08/12/25            Patient will demonstrate independence in home program for support of progression (Met)       Start:  06/17/25    Expected End:  07/15/25    Resolved:  07/28/25            Functional outcome       Patient will show a significant change in knee FOTO patient-reported outcome tool to goal score to demonstrate subjective improvement       Start:  07/28/25    Expected End:  09/22/25            Patient will achieve 10 heel taps on L knee for 30 second heel tap functional test       Start:  07/28/25    Expected End:  09/22/25               Pain       Patient will report knee pain of 1-2/10 demonstrating a reduction of overall pain       Start:  07/28/25    Expected End:  09/22/25            Patient will report a 2 point reduction in knee pain.       Start:  07/28/25    Expected End:  08/25/25               Range of Motion       Patient will achieve bilateral knee ROM of  0-125 degrees        Start:  07/28/25    Expected End:  09/22/25               Strength       Patient will achieve bilateral ankle eversion strength of 4+/5 (Progressing)       Start:  06/17/25    Expected End:  08/12/25               Strength       Patient will achieve bilateral hip extension strength of 4+/5       Start:  07/28/25    Expected End:  09/22/25              Resolved       Pain       Patient will report pain of 0-1/10 demonstrating a reduction of overall pain (Met)       Start:  06/17/25    Expected End:   08/12/25    Resolved:  07/28/25         Patient will report a 2 point reduction in pain. (Met)       Start:  06/17/25    Expected End:  07/15/25    Resolved:  07/28/25            Range of Motion       Patient will achieve bilateral ankle eversion ROM 10 degrees (Met)       Start:  06/17/25    Expected End:  08/12/25    Resolved:  07/28/25             Ed Steele, PT

## 2025-08-07 ENCOUNTER — CLINICAL SUPPORT (OUTPATIENT)
Dept: REHABILITATION | Facility: HOSPITAL | Age: 40
End: 2025-08-07
Payer: COMMERCIAL

## 2025-08-07 DIAGNOSIS — R29.898 WEAKNESS OF BOTH LOWER EXTREMITIES: ICD-10-CM

## 2025-08-07 DIAGNOSIS — G89.29 CHRONIC PAIN OF LEFT KNEE: ICD-10-CM

## 2025-08-07 DIAGNOSIS — M25.562 CHRONIC PAIN OF LEFT KNEE: ICD-10-CM

## 2025-08-07 DIAGNOSIS — M25.571 ACUTE RIGHT ANKLE PAIN: ICD-10-CM

## 2025-08-07 DIAGNOSIS — M76.71 PERONEAL TENDINITIS OF RIGHT LOWER EXTREMITY: Primary | ICD-10-CM

## 2025-08-07 PROCEDURE — 97530 THERAPEUTIC ACTIVITIES: CPT

## 2025-08-07 PROCEDURE — 97140 MANUAL THERAPY 1/> REGIONS: CPT

## 2025-08-07 PROCEDURE — 97110 THERAPEUTIC EXERCISES: CPT

## 2025-08-07 PROCEDURE — 97112 NEUROMUSCULAR REEDUCATION: CPT

## 2025-08-07 NOTE — PROGRESS NOTES
"  Outpatient Rehab    Physical Therapy Visit     Patient Name: Francoise Harris  MRN: [id number]  YOB: 1985  Encounter Date: 8/7/2025    Therapy Diagnosis:   Encounter Diagnoses   Name Primary?    Peroneal tendinitis of right lower extremity Yes    Weakness of both lower extremities     Acute right ankle pain     Chronic pain of left knee      Physician: An Page MD    Physician Orders: Eval and Treat  Medical Diagnosis: Peroneal tendinitis of right lower extremity  Surgical Diagnosis: Not applicable for this Episode   Surgical Date: Not applicable for this Episode  Days Since Last Surgery: Not applicable for this Episode  Visit # / Visits Authorized:  13 / 20  Insurance Authorization Period: 6/17/2025 to 12/31/2025  Date of Evaluation: 6/17/2025  Plan of Care Certification: 7/28/2025 to 9/22/2025     PT/PTA:     Number of PTA visits since last PT visit:   Time In: 1645   Time Out: 1750  Total Time (in minutes): 65   Total Billable Time (in minutes): 58    Subjective   Patient reports her knee has been continuing to hurt in the back of her knee along the posterior medial side..  Pain reported as 4/10. Right ankle: 0/10, Left Knee: 4/10  Objective        Treatment:     CPT Intervention Performed   Today Duration / Intensity   MT Soft tissue massage x Medial hamstring, adductors     Joint Mobilizations  Dorsal glides of cuboid, distraction          TE Bike x 7 minutes    Prone Quad Stretch x 5 x 30" with strap, left    Hamstring Stretch x 5 x 30" with strap, left     4 Way Ankle  Eversion x30 Blue TB  Inversion x30 Blue TB  Dorsiflexion x30 Blue TB  Plantar Flexion x30 Black TB    Knee flexion Isometrics into ball x 45 second holds x 5     Slant Board Stretch  4 x 30" holds    Soleus Stretch  4 x 30" holds    Side Lying Eversion  3 x 15 with 3#    Side Lying Inversion  3 x 15 with 3#    BFR   -  -   Occlusion 80% x 30, 3 x 15  LAQ, 3#  Mini Sit to Stand  Leg Press 25#          Objective Testing  " Measurements, FOTO, education          NMR Seated Heel Raises  3 x 12 with 75# SL     Single Leg Stance  3 x 10 ball toss on foam     Toe Crunches  3 minutes, standing single leg    MOBO Board  2'/2' standing    Bridges x 3x10     Prone Hip Extension x 3 x 10 bilateral 3#     Side Lying Hip Abduction x 3 x 10 bilateral red band    Sidelying clamshells x 2x10 each side GTB          TA Heel Raises  3 x 10 single leg standing  3 x 15 on leg press 75# SL     Side Stepping x 3 laps of 10 steps pink loop around mid foot     Squats x 3 x 10 with 25# KB    Leg Press x 3 x 10 75# SL          PLAN        CPT Codes available for Billing:   (10) minutes of Manual therapy (MT) to improve pain and ROM.  (25) minutes of Therapeutic Exercise (TE) to develop strength, endurance, range of motion, and flexibility.  (13) minutes of Neuromuscular Re-Education (NMR)  to improve: Balance, Coordination, Kinesthetic, Sense, Proprioception, and Posture.  (10) minutes of Therapeutic Activities (TA) to improve functional performance.  Vasopneumatic Device Therapy () for management of swelling/edema. (19039)  Unattended Electrical Stimulation (ES) for muscle performance or pain modulation.  BFR: Blood flow restriction applied during exercise    Assessment & Plan   Assessment: Patient tolerated session fair, we added in hamstring isometrics today to work on pain modulation of the area using isometric protocol of 45 second holds x 5 with intermittent exercises between the holds. She had reproduction of pain in her knee with any lateral movement.   Evaluation/Treatment Tolerance: Patient tolerated treatment well  The patient will continue to benefit from skilled outpatient physical therapy in order to address the deficits listed in the problem list on the initial evaluation, provide patient and family education, and maximize the patients level of independence in the home and community environments.     The patient's spiritual, cultural, and  educational needs were considered, and the patient is agreeable to the plan of care and goals.      Plan: Continue Plan of Care (POC) and progress per patient tolerance. See treatment section for details on planned progressions next session.From a physical therapy perspective, the patient would benefit from:     Goals:   Active       Functional outcome       Patient will show a significant change in FOTO patient-reported outcome tool to goal score to demonstrate subjective improvement (Progressing)       Start:  06/17/25    Expected End:  08/12/25            Patient will demonstrate independence in home program for support of progression (Met)       Start:  06/17/25    Expected End:  07/15/25    Resolved:  07/28/25            Functional outcome       Patient will show a significant change in knee FOTO patient-reported outcome tool to goal score to demonstrate subjective improvement       Start:  07/28/25    Expected End:  09/22/25            Patient will achieve 10 heel taps on L knee for 30 second heel tap functional test       Start:  07/28/25    Expected End:  09/22/25               Pain       Patient will report knee pain of 1-2/10 demonstrating a reduction of overall pain       Start:  07/28/25    Expected End:  09/22/25            Patient will report a 2 point reduction in knee pain.       Start:  07/28/25    Expected End:  08/25/25               Range of Motion       Patient will achieve bilateral knee ROM of  0-125 degrees        Start:  07/28/25    Expected End:  09/22/25               Strength       Patient will achieve bilateral ankle eversion strength of 4+/5 (Progressing)       Start:  06/17/25    Expected End:  08/12/25               Strength       Patient will achieve bilateral hip extension strength of 4+/5       Start:  07/28/25    Expected End:  09/22/25              Resolved       Pain       Patient will report pain of 0-1/10 demonstrating a reduction of overall pain (Met)       Start:  06/17/25     Expected End:  08/12/25    Resolved:  07/28/25         Patient will report a 2 point reduction in pain. (Met)       Start:  06/17/25    Expected End:  07/15/25    Resolved:  07/28/25            Range of Motion       Patient will achieve bilateral ankle eversion ROM 10 degrees (Met)       Start:  06/17/25    Expected End:  08/12/25    Resolved:  07/28/25             Wilian Alba, PT

## 2025-08-11 ENCOUNTER — CLINICAL SUPPORT (OUTPATIENT)
Dept: REHABILITATION | Facility: HOSPITAL | Age: 40
End: 2025-08-11
Payer: COMMERCIAL

## 2025-08-11 DIAGNOSIS — M25.571 ACUTE RIGHT ANKLE PAIN: ICD-10-CM

## 2025-08-11 DIAGNOSIS — G89.29 CHRONIC PAIN OF LEFT KNEE: ICD-10-CM

## 2025-08-11 DIAGNOSIS — M25.562 CHRONIC PAIN OF LEFT KNEE: ICD-10-CM

## 2025-08-11 DIAGNOSIS — M76.71 PERONEAL TENDINITIS OF RIGHT LOWER EXTREMITY: Primary | ICD-10-CM

## 2025-08-11 DIAGNOSIS — R29.898 WEAKNESS OF BOTH LOWER EXTREMITIES: ICD-10-CM

## 2025-08-11 PROCEDURE — 97110 THERAPEUTIC EXERCISES: CPT

## 2025-08-11 PROCEDURE — 97112 NEUROMUSCULAR REEDUCATION: CPT

## 2025-08-11 PROCEDURE — 97140 MANUAL THERAPY 1/> REGIONS: CPT

## 2025-08-14 ENCOUNTER — CLINICAL SUPPORT (OUTPATIENT)
Dept: REHABILITATION | Facility: HOSPITAL | Age: 40
End: 2025-08-14
Payer: COMMERCIAL

## 2025-08-14 DIAGNOSIS — M25.562 CHRONIC PAIN OF LEFT KNEE: ICD-10-CM

## 2025-08-14 DIAGNOSIS — G89.29 CHRONIC PAIN OF LEFT KNEE: ICD-10-CM

## 2025-08-14 DIAGNOSIS — M76.71 PERONEAL TENDINITIS OF RIGHT LOWER EXTREMITY: Primary | ICD-10-CM

## 2025-08-14 DIAGNOSIS — M25.571 ACUTE RIGHT ANKLE PAIN: ICD-10-CM

## 2025-08-14 DIAGNOSIS — R29.898 WEAKNESS OF BOTH LOWER EXTREMITIES: ICD-10-CM

## 2025-08-14 PROCEDURE — 97112 NEUROMUSCULAR REEDUCATION: CPT

## 2025-08-14 PROCEDURE — 97140 MANUAL THERAPY 1/> REGIONS: CPT

## 2025-08-14 PROCEDURE — 97110 THERAPEUTIC EXERCISES: CPT

## 2025-08-18 ENCOUNTER — CLINICAL SUPPORT (OUTPATIENT)
Dept: REHABILITATION | Facility: HOSPITAL | Age: 40
End: 2025-08-18
Payer: COMMERCIAL

## 2025-08-18 DIAGNOSIS — M25.562 CHRONIC PAIN OF LEFT KNEE: ICD-10-CM

## 2025-08-18 DIAGNOSIS — M25.571 ACUTE RIGHT ANKLE PAIN: ICD-10-CM

## 2025-08-18 DIAGNOSIS — R29.898 WEAKNESS OF BOTH LOWER EXTREMITIES: ICD-10-CM

## 2025-08-18 DIAGNOSIS — M76.71 PERONEAL TENDINITIS OF RIGHT LOWER EXTREMITY: Primary | ICD-10-CM

## 2025-08-18 DIAGNOSIS — G89.29 CHRONIC PAIN OF LEFT KNEE: ICD-10-CM

## 2025-08-18 PROCEDURE — 97110 THERAPEUTIC EXERCISES: CPT

## 2025-08-18 PROCEDURE — 97112 NEUROMUSCULAR REEDUCATION: CPT

## 2025-08-18 PROCEDURE — 97140 MANUAL THERAPY 1/> REGIONS: CPT

## 2025-08-21 ENCOUNTER — CLINICAL SUPPORT (OUTPATIENT)
Dept: REHABILITATION | Facility: HOSPITAL | Age: 40
End: 2025-08-21
Payer: COMMERCIAL

## 2025-08-21 DIAGNOSIS — R29.898 WEAKNESS OF BOTH LOWER EXTREMITIES: ICD-10-CM

## 2025-08-21 DIAGNOSIS — M25.562 CHRONIC PAIN OF LEFT KNEE: ICD-10-CM

## 2025-08-21 DIAGNOSIS — G89.29 CHRONIC PAIN OF LEFT KNEE: ICD-10-CM

## 2025-08-21 DIAGNOSIS — M25.571 ACUTE RIGHT ANKLE PAIN: ICD-10-CM

## 2025-08-21 DIAGNOSIS — M76.71 PERONEAL TENDINITIS OF RIGHT LOWER EXTREMITY: Primary | ICD-10-CM

## 2025-08-21 PROCEDURE — 97110 THERAPEUTIC EXERCISES: CPT

## 2025-08-21 PROCEDURE — 97112 NEUROMUSCULAR REEDUCATION: CPT

## 2025-08-25 ENCOUNTER — OFFICE VISIT (OUTPATIENT)
Dept: ORTHOPEDICS | Facility: CLINIC | Age: 40
End: 2025-08-25
Payer: COMMERCIAL

## 2025-08-25 ENCOUNTER — CLINICAL SUPPORT (OUTPATIENT)
Dept: REHABILITATION | Facility: HOSPITAL | Age: 40
End: 2025-08-25
Payer: COMMERCIAL

## 2025-08-25 ENCOUNTER — HOSPITAL ENCOUNTER (OUTPATIENT)
Dept: RADIOLOGY | Facility: HOSPITAL | Age: 40
Discharge: HOME | End: 2025-08-25
Attending: ORTHOPAEDIC SURGERY
Payer: COMMERCIAL

## 2025-08-25 VITALS — WEIGHT: 248.44 LBS | BODY MASS INDEX: 40.1 KG/M2

## 2025-08-25 DIAGNOSIS — M25.562 CHRONIC PAIN OF LEFT KNEE: ICD-10-CM

## 2025-08-25 DIAGNOSIS — M76.71 PERONEAL TENDINITIS OF RIGHT LOWER EXTREMITY: Primary | ICD-10-CM

## 2025-08-25 DIAGNOSIS — M25.571 ACUTE RIGHT ANKLE PAIN: ICD-10-CM

## 2025-08-25 DIAGNOSIS — M17.12 ARTHRITIS OF LEFT KNEE: Primary | ICD-10-CM

## 2025-08-25 DIAGNOSIS — M25.562 LEFT KNEE PAIN: ICD-10-CM

## 2025-08-25 DIAGNOSIS — R29.898 WEAKNESS OF BOTH LOWER EXTREMITIES: ICD-10-CM

## 2025-08-25 DIAGNOSIS — M76.71 PERONEAL TENDINITIS OF RIGHT LOWER EXTREMITY: ICD-10-CM

## 2025-08-25 DIAGNOSIS — G89.29 CHRONIC PAIN OF LEFT KNEE: ICD-10-CM

## 2025-08-25 PROCEDURE — 99214 OFFICE O/P EST MOD 30 MIN: CPT | Mod: S$GLB,,, | Performed by: ORTHOPAEDIC SURGERY

## 2025-08-25 PROCEDURE — 73564 X-RAY EXAM KNEE 4 OR MORE: CPT | Mod: 26,LT,, | Performed by: STUDENT IN AN ORGANIZED HEALTH CARE EDUCATION/TRAINING PROGRAM

## 2025-08-25 PROCEDURE — 97530 THERAPEUTIC ACTIVITIES: CPT

## 2025-08-25 PROCEDURE — 97110 THERAPEUTIC EXERCISES: CPT

## 2025-08-25 PROCEDURE — 99999 PR PBB SHADOW E&M-EST. PATIENT-LVL II: CPT | Mod: PBBFAC,,, | Performed by: ORTHOPAEDIC SURGERY

## 2025-08-25 PROCEDURE — 3008F BODY MASS INDEX DOCD: CPT | Mod: CPTII,S$GLB,, | Performed by: ORTHOPAEDIC SURGERY

## 2025-08-25 PROCEDURE — 73564 X-RAY EXAM KNEE 4 OR MORE: CPT | Mod: TC,LT

## 2025-08-25 PROCEDURE — 97112 NEUROMUSCULAR REEDUCATION: CPT

## 2025-08-25 PROCEDURE — 1159F MED LIST DOCD IN RCRD: CPT | Mod: CPTII,S$GLB,, | Performed by: ORTHOPAEDIC SURGERY

## 2025-08-28 ENCOUNTER — CLINICAL SUPPORT (OUTPATIENT)
Dept: REHABILITATION | Facility: HOSPITAL | Age: 40
End: 2025-08-28
Payer: COMMERCIAL

## 2025-08-28 DIAGNOSIS — R29.898 WEAKNESS OF BOTH LOWER EXTREMITIES: ICD-10-CM

## 2025-08-28 DIAGNOSIS — M25.571 ACUTE RIGHT ANKLE PAIN: ICD-10-CM

## 2025-08-28 DIAGNOSIS — G89.29 CHRONIC PAIN OF LEFT KNEE: ICD-10-CM

## 2025-08-28 DIAGNOSIS — M25.562 CHRONIC PAIN OF LEFT KNEE: ICD-10-CM

## 2025-08-28 DIAGNOSIS — M76.71 PERONEAL TENDINITIS OF RIGHT LOWER EXTREMITY: Primary | ICD-10-CM

## 2025-08-28 PROCEDURE — 97112 NEUROMUSCULAR REEDUCATION: CPT

## 2025-08-28 PROCEDURE — 97110 THERAPEUTIC EXERCISES: CPT

## 2025-08-28 PROCEDURE — 97530 THERAPEUTIC ACTIVITIES: CPT

## 2025-09-02 ENCOUNTER — CLINICAL SUPPORT (OUTPATIENT)
Dept: REHABILITATION | Facility: HOSPITAL | Age: 40
End: 2025-09-02
Payer: COMMERCIAL

## 2025-09-02 DIAGNOSIS — M25.562 CHRONIC PAIN OF LEFT KNEE: ICD-10-CM

## 2025-09-02 DIAGNOSIS — M25.571 ACUTE RIGHT ANKLE PAIN: ICD-10-CM

## 2025-09-02 DIAGNOSIS — R29.898 WEAKNESS OF BOTH LOWER EXTREMITIES: ICD-10-CM

## 2025-09-02 DIAGNOSIS — M76.71 PERONEAL TENDINITIS OF RIGHT LOWER EXTREMITY: Primary | ICD-10-CM

## 2025-09-02 DIAGNOSIS — G89.29 CHRONIC PAIN OF LEFT KNEE: ICD-10-CM

## 2025-09-02 PROCEDURE — 97110 THERAPEUTIC EXERCISES: CPT
